# Patient Record
Sex: FEMALE | Race: WHITE | Employment: UNEMPLOYED | URBAN - METROPOLITAN AREA
[De-identification: names, ages, dates, MRNs, and addresses within clinical notes are randomized per-mention and may not be internally consistent; named-entity substitution may affect disease eponyms.]

---

## 2017-01-19 ENCOUNTER — TELEPHONE (OUTPATIENT)
Dept: ORTHOPEDIC SURGERY | Age: 57
End: 2017-01-19

## 2017-01-20 ENCOUNTER — TELEPHONE (OUTPATIENT)
Dept: ORTHOPEDIC SURGERY | Age: 57
End: 2017-01-20

## 2017-01-23 ENCOUNTER — OFFICE VISIT (OUTPATIENT)
Dept: ORTHOPEDIC SURGERY | Age: 57
End: 2017-01-23

## 2017-01-23 VITALS — RESPIRATION RATE: 16 BRPM | WEIGHT: 250 LBS | HEIGHT: 60 IN | BODY MASS INDEX: 49.08 KG/M2

## 2017-01-23 DIAGNOSIS — R52 PAIN: Primary | ICD-10-CM

## 2017-01-23 DIAGNOSIS — S82.892A ANKLE FRACTURE, LEFT, CLOSED, INITIAL ENCOUNTER: ICD-10-CM

## 2017-01-23 PROCEDURE — 73610 X-RAY EXAM OF ANKLE: CPT | Performed by: ORTHOPAEDIC SURGERY

## 2017-01-23 PROCEDURE — 99204 OFFICE O/P NEW MOD 45 MIN: CPT | Performed by: ORTHOPAEDIC SURGERY

## 2017-01-23 RX ORDER — PRAVASTATIN SODIUM 40 MG
40 TABLET ORAL
COMMUNITY
End: 2017-02-14 | Stop reason: ALTCHOICE

## 2017-01-23 RX ORDER — CARISOPRODOL 250 MG/1
250 TABLET ORAL
COMMUNITY
End: 2018-05-30 | Stop reason: ALTCHOICE

## 2017-01-23 RX ORDER — METOPROLOL TARTRATE 50 MG/1
50 TABLET, FILM COATED ORAL
COMMUNITY
End: 2017-11-28 | Stop reason: SDUPTHER

## 2017-01-23 RX ORDER — LANSOPRAZOLE 30 MG/1
30 CAPSULE, DELAYED RELEASE ORAL
COMMUNITY
End: 2017-02-14 | Stop reason: ALTCHOICE

## 2017-01-23 ASSESSMENT — ENCOUNTER SYMPTOMS
GASTROINTESTINAL NEGATIVE: 1
RESPIRATORY NEGATIVE: 1
EYES NEGATIVE: 1

## 2017-01-26 ENCOUNTER — OFFICE VISIT (OUTPATIENT)
Dept: ORTHOPEDIC SURGERY | Age: 57
End: 2017-01-26

## 2017-01-26 VITALS — RESPIRATION RATE: 16 BRPM | HEIGHT: 60 IN | BODY MASS INDEX: 49.08 KG/M2 | WEIGHT: 250 LBS

## 2017-01-26 DIAGNOSIS — R52 PAIN: ICD-10-CM

## 2017-01-26 DIAGNOSIS — S82.892A ANKLE FRACTURE, LEFT, CLOSED, INITIAL ENCOUNTER: Primary | ICD-10-CM

## 2017-01-26 PROCEDURE — 73610 X-RAY EXAM OF ANKLE: CPT | Performed by: ORTHOPAEDIC SURGERY

## 2017-01-26 PROCEDURE — 99024 POSTOP FOLLOW-UP VISIT: CPT | Performed by: ORTHOPAEDIC SURGERY

## 2017-01-26 RX ORDER — HYDROCODONE BITARTRATE AND ACETAMINOPHEN 5; 325 MG/1; MG/1
1 TABLET ORAL EVERY 6 HOURS PRN
Qty: 30 TABLET | Refills: 0 | Status: SHIPPED | OUTPATIENT
Start: 2017-01-26 | End: 2017-02-14 | Stop reason: ALTCHOICE

## 2017-01-26 RX ORDER — LEVOTHYROXINE SODIUM 112 UG/1
TABLET ORAL
COMMUNITY
End: 2017-05-18 | Stop reason: DRUGHIGH

## 2017-01-26 RX ORDER — TOPIRAMATE 100 MG/1
1 TABLET, FILM COATED ORAL
COMMUNITY
End: 2017-03-09 | Stop reason: SDUPTHER

## 2017-01-26 ASSESSMENT — ENCOUNTER SYMPTOMS
RESPIRATORY NEGATIVE: 1
GASTROINTESTINAL NEGATIVE: 1
EYES NEGATIVE: 1

## 2017-02-02 ENCOUNTER — OFFICE VISIT (OUTPATIENT)
Dept: ORTHOPEDIC SURGERY | Age: 57
End: 2017-02-02

## 2017-02-02 VITALS — BODY MASS INDEX: 49.08 KG/M2 | WEIGHT: 250 LBS | RESPIRATION RATE: 16 BRPM | HEIGHT: 60 IN

## 2017-02-02 DIAGNOSIS — R52 PAIN: ICD-10-CM

## 2017-02-02 DIAGNOSIS — S82.892A ANKLE FRACTURE, LEFT, CLOSED, INITIAL ENCOUNTER: Primary | ICD-10-CM

## 2017-02-02 PROCEDURE — 99024 POSTOP FOLLOW-UP VISIT: CPT | Performed by: ORTHOPAEDIC SURGERY

## 2017-02-02 PROCEDURE — 29405 APPL SHORT LEG CAST: CPT | Performed by: ORTHOPAEDIC SURGERY

## 2017-02-02 PROCEDURE — 73610 X-RAY EXAM OF ANKLE: CPT | Performed by: ORTHOPAEDIC SURGERY

## 2017-02-02 RX ORDER — TOPIRAMATE 50 MG/1
TABLET, FILM COATED ORAL
COMMUNITY
Start: 2016-12-29 | End: 2017-02-02 | Stop reason: SDUPTHER

## 2017-02-02 RX ORDER — DICYCLOMINE HCL 20 MG
TABLET ORAL
COMMUNITY
Start: 2016-10-27 | End: 2017-02-14 | Stop reason: SDUPTHER

## 2017-02-02 RX ORDER — ONDANSETRON 4 MG/1
TABLET, ORALLY DISINTEGRATING ORAL
COMMUNITY
Start: 2017-01-19 | End: 2017-02-14 | Stop reason: ALTCHOICE

## 2017-02-02 RX ORDER — RANITIDINE 150 MG/1
TABLET ORAL
COMMUNITY
Start: 2016-12-22 | End: 2017-02-14 | Stop reason: SDUPTHER

## 2017-02-02 RX ORDER — LOSARTAN POTASSIUM 50 MG/1
TABLET ORAL
COMMUNITY
Start: 2017-01-03 | End: 2017-02-14 | Stop reason: ALTCHOICE

## 2017-02-02 ASSESSMENT — ENCOUNTER SYMPTOMS
RESPIRATORY NEGATIVE: 1
EYES NEGATIVE: 1
GASTROINTESTINAL NEGATIVE: 1

## 2017-02-14 ENCOUNTER — OFFICE VISIT (OUTPATIENT)
Dept: FAMILY MEDICINE CLINIC | Age: 57
End: 2017-02-14

## 2017-02-14 VITALS
OXYGEN SATURATION: 96 % | BODY MASS INDEX: 49.08 KG/M2 | DIASTOLIC BLOOD PRESSURE: 82 MMHG | HEART RATE: 58 BPM | RESPIRATION RATE: 22 BRPM | SYSTOLIC BLOOD PRESSURE: 126 MMHG | WEIGHT: 250 LBS | HEIGHT: 60 IN

## 2017-02-14 DIAGNOSIS — Z00.00 ROUTINE HEALTH MAINTENANCE: Primary | ICD-10-CM

## 2017-02-14 DIAGNOSIS — R73.9 ELEVATED BLOOD SUGAR: ICD-10-CM

## 2017-02-14 DIAGNOSIS — S82.892D CLOSED FRACTURE OF LEFT ANKLE WITH ROUTINE HEALING: ICD-10-CM

## 2017-02-14 DIAGNOSIS — E66.01 MORBID OBESITY DUE TO EXCESS CALORIES (HCC): ICD-10-CM

## 2017-02-14 DIAGNOSIS — E03.9 ACQUIRED HYPOTHYROIDISM: ICD-10-CM

## 2017-02-14 DIAGNOSIS — I10 ESSENTIAL HYPERTENSION: ICD-10-CM

## 2017-02-14 DIAGNOSIS — M62.830 BACK MUSCLE SPASM: ICD-10-CM

## 2017-02-14 DIAGNOSIS — K21.9 GASTROESOPHAGEAL REFLUX DISEASE WITHOUT ESOPHAGITIS: ICD-10-CM

## 2017-02-14 LAB
A/G RATIO: 1.6 (ref 1.1–2.2)
ALBUMIN SERPL-MCNC: 4.3 G/DL (ref 3.4–5)
ALP BLD-CCNC: 114 U/L (ref 40–129)
ALT SERPL-CCNC: 25 U/L (ref 10–40)
ANION GAP SERPL CALCULATED.3IONS-SCNC: 15 MMOL/L (ref 3–16)
AST SERPL-CCNC: 19 U/L (ref 15–37)
BILIRUB SERPL-MCNC: 0.6 MG/DL (ref 0–1)
BUN BLDV-MCNC: 11 MG/DL (ref 7–20)
CALCIUM SERPL-MCNC: 9.8 MG/DL (ref 8.3–10.6)
CHLORIDE BLD-SCNC: 100 MMOL/L (ref 99–110)
CHOLESTEROL, TOTAL: 271 MG/DL (ref 0–199)
CO2: 27 MMOL/L (ref 21–32)
CREAT SERPL-MCNC: 0.6 MG/DL (ref 0.6–1.1)
GFR AFRICAN AMERICAN: >60
GFR NON-AFRICAN AMERICAN: >60
GLOBULIN: 2.7 G/DL
GLUCOSE BLD-MCNC: 158 MG/DL (ref 70–99)
HCT VFR BLD CALC: 43.2 % (ref 36–48)
HDLC SERPL-MCNC: 30 MG/DL (ref 40–60)
HEMOGLOBIN: 13.7 G/DL (ref 12–16)
HEPATITIS C ANTIBODY INTERPRETATION: NORMAL
LDL CHOLESTEROL CALCULATED: ABNORMAL MG/DL
LDL CHOLESTEROL DIRECT: 195 MG/DL
MCH RBC QN AUTO: 27.5 PG (ref 26–34)
MCHC RBC AUTO-ENTMCNC: 31.7 G/DL (ref 31–36)
MCV RBC AUTO: 86.7 FL (ref 80–100)
PDW BLD-RTO: 13.7 % (ref 12.4–15.4)
PLATELET # BLD: 291 K/UL (ref 135–450)
PMV BLD AUTO: 10 FL (ref 5–10.5)
POTASSIUM SERPL-SCNC: 4.7 MMOL/L (ref 3.5–5.1)
RBC # BLD: 4.98 M/UL (ref 4–5.2)
SODIUM BLD-SCNC: 142 MMOL/L (ref 136–145)
TOTAL PROTEIN: 7 G/DL (ref 6.4–8.2)
TRIGL SERPL-MCNC: 348 MG/DL (ref 0–150)
TSH REFLEX: 2.91 UIU/ML (ref 0.27–4.2)
VLDLC SERPL CALC-MCNC: ABNORMAL MG/DL
WBC # BLD: 7.8 K/UL (ref 4–11)

## 2017-02-14 PROCEDURE — 99204 OFFICE O/P NEW MOD 45 MIN: CPT | Performed by: NURSE PRACTITIONER

## 2017-02-14 RX ORDER — ESOMEPRAZOLE MAGNESIUM 40 MG/1
40 CAPSULE, DELAYED RELEASE ORAL DAILY
Qty: 30 CAPSULE | Refills: 3 | Status: SHIPPED | OUTPATIENT
Start: 2017-02-14 | End: 2017-05-18 | Stop reason: SDUPTHER

## 2017-02-14 RX ORDER — DICYCLOMINE HCL 20 MG
20 TABLET ORAL 3 TIMES DAILY
Qty: 90 TABLET | Refills: 3 | Status: SHIPPED | OUTPATIENT
Start: 2017-02-14 | End: 2017-05-18 | Stop reason: SDUPTHER

## 2017-02-14 RX ORDER — RANITIDINE 150 MG/1
150 TABLET ORAL NIGHTLY
Qty: 60 TABLET | Refills: 3 | Status: SHIPPED | OUTPATIENT
Start: 2017-02-14 | End: 2017-11-06 | Stop reason: SDUPTHER

## 2017-02-14 RX ORDER — LISINOPRIL 10 MG/1
10 TABLET ORAL DAILY
COMMUNITY
End: 2017-02-14 | Stop reason: SINTOL

## 2017-02-14 RX ORDER — IBUPROFEN 800 MG/1
800 TABLET ORAL EVERY 8 HOURS PRN
Qty: 90 TABLET | Refills: 0 | Status: SHIPPED | OUTPATIENT
Start: 2017-02-14 | End: 2019-04-10

## 2017-02-14 RX ORDER — LOSARTAN POTASSIUM 50 MG/1
50 TABLET ORAL DAILY
Qty: 30 TABLET | Refills: 3 | Status: SHIPPED | OUTPATIENT
Start: 2017-02-14 | End: 2017-05-18 | Stop reason: SDUPTHER

## 2017-02-14 ASSESSMENT — ENCOUNTER SYMPTOMS
RHINORRHEA: 0
EYE DISCHARGE: 0
BACK PAIN: 1
EYE PAIN: 0
COLOR CHANGE: 0
CHEST TIGHTNESS: 0
GASTROINTESTINAL NEGATIVE: 1
EYE REDNESS: 0
WHEEZING: 0
SHORTNESS OF BREATH: 0
ABDOMINAL PAIN: 0
SORE THROAT: 0
RESPIRATORY NEGATIVE: 1
SINUS PRESSURE: 0
NAUSEA: 0
EYES NEGATIVE: 1
COUGH: 0

## 2017-02-15 DIAGNOSIS — E11.9 TYPE 2 DIABETES MELLITUS WITHOUT COMPLICATION, WITHOUT LONG-TERM CURRENT USE OF INSULIN (HCC): Primary | ICD-10-CM

## 2017-02-15 DIAGNOSIS — E78.2 MIXED HYPERLIPIDEMIA: Primary | ICD-10-CM

## 2017-02-15 PROBLEM — S82.892D CLOSED FRACTURE OF LEFT ANKLE WITH ROUTINE HEALING: Status: ACTIVE | Noted: 2017-02-15

## 2017-02-15 LAB
ESTIMATED AVERAGE GLUCOSE: 165.7 MG/DL
HBA1C MFR BLD: 7.4 %

## 2017-02-15 RX ORDER — BLOOD-GLUCOSE METER
EACH MISCELLANEOUS
Qty: 1 KIT | Refills: 0 | Status: SHIPPED | OUTPATIENT
Start: 2017-02-15

## 2017-02-15 RX ORDER — ATORVASTATIN CALCIUM 20 MG/1
20 TABLET, FILM COATED ORAL DAILY
Qty: 30 TABLET | Refills: 3 | Status: SHIPPED | OUTPATIENT
Start: 2017-02-15 | End: 2017-05-18 | Stop reason: SDUPTHER

## 2017-02-15 RX ORDER — LANCETS 30 GAUGE
EACH MISCELLANEOUS
Qty: 100 EACH | Refills: 3 | Status: SHIPPED | OUTPATIENT
Start: 2017-02-15

## 2017-02-16 ENCOUNTER — OFFICE VISIT (OUTPATIENT)
Dept: ORTHOPEDIC SURGERY | Age: 57
End: 2017-02-16

## 2017-02-16 VITALS — RESPIRATION RATE: 16 BRPM | HEIGHT: 60 IN | BODY MASS INDEX: 49.08 KG/M2 | WEIGHT: 250 LBS

## 2017-02-16 DIAGNOSIS — S82.892A ANKLE FRACTURE, LEFT, CLOSED, INITIAL ENCOUNTER: Primary | ICD-10-CM

## 2017-02-16 DIAGNOSIS — R52 PAIN: ICD-10-CM

## 2017-02-16 PROCEDURE — 73610 X-RAY EXAM OF ANKLE: CPT | Performed by: ORTHOPAEDIC SURGERY

## 2017-02-16 PROCEDURE — 99024 POSTOP FOLLOW-UP VISIT: CPT | Performed by: ORTHOPAEDIC SURGERY

## 2017-02-16 RX ORDER — HYDROCODONE BITARTRATE AND ACETAMINOPHEN 5; 325 MG/1; MG/1
TABLET ORAL
COMMUNITY
Start: 2017-01-26 | End: 2017-06-15

## 2017-02-16 ASSESSMENT — ENCOUNTER SYMPTOMS
RESPIRATORY NEGATIVE: 1
EYES NEGATIVE: 1
GASTROINTESTINAL NEGATIVE: 1

## 2017-02-17 ENCOUNTER — TELEPHONE (OUTPATIENT)
Dept: FAMILY MEDICINE CLINIC | Age: 57
End: 2017-02-17

## 2017-02-28 ENCOUNTER — HOSPITAL ENCOUNTER (OUTPATIENT)
Dept: PHYSICAL THERAPY | Age: 57
Discharge: HOME | End: 2017-02-28
Attending: NURSE PRACTITIONER | Admitting: NURSE PRACTITIONER

## 2017-02-28 ENCOUNTER — OFFICE VISIT (OUTPATIENT)
Dept: ORTHOPEDIC SURGERY | Age: 57
End: 2017-02-28

## 2017-02-28 VITALS — WEIGHT: 250 LBS | BODY MASS INDEX: 49.08 KG/M2 | HEIGHT: 60 IN | RESPIRATION RATE: 18 BRPM

## 2017-02-28 DIAGNOSIS — S82.892A ANKLE FRACTURE, LEFT, CLOSED, INITIAL ENCOUNTER: Primary | ICD-10-CM

## 2017-02-28 DIAGNOSIS — S82.892D CLOSED FRACTURE OF LEFT ANKLE WITH ROUTINE HEALING: ICD-10-CM

## 2017-02-28 DIAGNOSIS — R52 PAIN: ICD-10-CM

## 2017-02-28 PROCEDURE — 99024 POSTOP FOLLOW-UP VISIT: CPT | Performed by: ORTHOPAEDIC SURGERY

## 2017-02-28 PROCEDURE — 73610 X-RAY EXAM OF ANKLE: CPT | Performed by: ORTHOPAEDIC SURGERY

## 2017-02-28 ASSESSMENT — PAIN DESCRIPTION - PAIN TYPE: TYPE: CHRONIC PAIN

## 2017-02-28 ASSESSMENT — PAIN DESCRIPTION - FREQUENCY: FREQUENCY: INTERMITTENT

## 2017-02-28 ASSESSMENT — PAIN SCALES - GENERAL: PAINLEVEL_OUTOF10: 1

## 2017-02-28 ASSESSMENT — PAIN DESCRIPTION - LOCATION: LOCATION: BACK

## 2017-02-28 ASSESSMENT — PAIN DESCRIPTION - ONSET: ONSET: AWAKENED FROM SLEEP

## 2017-02-28 ASSESSMENT — PAIN DESCRIPTION - PROGRESSION: CLINICAL_PROGRESSION: GRADUALLY WORSENING

## 2017-03-01 ENCOUNTER — HOSPITAL ENCOUNTER (OUTPATIENT)
Dept: PHYSICAL THERAPY | Age: 57
Discharge: HOME | End: 2017-03-31
Attending: NURSE PRACTITIONER | Admitting: ORTHOPAEDIC SURGERY

## 2017-03-09 RX ORDER — TOPIRAMATE 100 MG/1
100 TABLET, FILM COATED ORAL DAILY
Qty: 60 TABLET | Refills: 0 | Status: SHIPPED | OUTPATIENT
Start: 2017-03-09 | End: 2017-03-10 | Stop reason: DRUGHIGH

## 2017-03-10 ENCOUNTER — TELEPHONE (OUTPATIENT)
Dept: FAMILY MEDICINE CLINIC | Age: 57
End: 2017-03-10

## 2017-03-10 ENCOUNTER — HOSPITAL ENCOUNTER (OUTPATIENT)
Dept: PHYSICAL THERAPY | Age: 57
Discharge: HOME | End: 2017-03-10
Admitting: NURSE PRACTITIONER

## 2017-03-10 RX ORDER — TOPIRAMATE 50 MG/1
50 TABLET, FILM COATED ORAL DAILY
Qty: 60 TABLET | Refills: 1 | Status: SHIPPED | OUTPATIENT
Start: 2017-03-10 | End: 2017-08-10 | Stop reason: SDUPTHER

## 2017-03-21 ENCOUNTER — HOSPITAL ENCOUNTER (OUTPATIENT)
Dept: PHYSICAL THERAPY | Age: 57
Discharge: HOME | End: 2017-03-21
Admitting: NURSE PRACTITIONER

## 2017-03-23 ENCOUNTER — OFFICE VISIT (OUTPATIENT)
Dept: ORTHOPEDIC SURGERY | Age: 57
End: 2017-03-23

## 2017-03-23 VITALS — WEIGHT: 250 LBS | HEIGHT: 60 IN | RESPIRATION RATE: 18 BRPM | BODY MASS INDEX: 49.08 KG/M2

## 2017-03-23 DIAGNOSIS — R52 PAIN: ICD-10-CM

## 2017-03-23 DIAGNOSIS — S82.892D CLOSED FRACTURE OF LEFT ANKLE WITH ROUTINE HEALING: Primary | ICD-10-CM

## 2017-03-23 DIAGNOSIS — S82.892A ANKLE FRACTURE, LEFT, CLOSED, INITIAL ENCOUNTER: ICD-10-CM

## 2017-03-23 DIAGNOSIS — G90.522 COMPLEX REGIONAL PAIN SYNDROME TYPE 1 OF LEFT LOWER EXTREMITY: ICD-10-CM

## 2017-03-23 PROCEDURE — 99024 POSTOP FOLLOW-UP VISIT: CPT | Performed by: ORTHOPAEDIC SURGERY

## 2017-03-23 PROCEDURE — 73562 X-RAY EXAM OF KNEE 3: CPT | Performed by: ORTHOPAEDIC SURGERY

## 2017-04-01 ENCOUNTER — HOSPITAL ENCOUNTER (OUTPATIENT)
Dept: PHYSICAL THERAPY | Age: 57
Discharge: HOME | End: 2017-04-30
Attending: NURSE PRACTITIONER | Admitting: ORTHOPAEDIC SURGERY

## 2017-04-04 ENCOUNTER — HOSPITAL ENCOUNTER (OUTPATIENT)
Dept: PHYSICAL THERAPY | Age: 57
Discharge: HOME | End: 2017-04-04
Admitting: ORTHOPAEDIC SURGERY

## 2017-04-10 ENCOUNTER — TELEPHONE (OUTPATIENT)
Dept: FAMILY MEDICINE CLINIC | Age: 57
End: 2017-04-10

## 2017-04-13 ENCOUNTER — OFFICE VISIT (OUTPATIENT)
Dept: FAMILY MEDICINE CLINIC | Age: 57
End: 2017-04-13

## 2017-04-13 VITALS
RESPIRATION RATE: 18 BRPM | OXYGEN SATURATION: 99 % | SYSTOLIC BLOOD PRESSURE: 120 MMHG | HEIGHT: 60 IN | DIASTOLIC BLOOD PRESSURE: 82 MMHG | WEIGHT: 260 LBS | BODY MASS INDEX: 51.04 KG/M2 | HEART RATE: 54 BPM

## 2017-04-13 DIAGNOSIS — G89.29 CHRONIC BILATERAL LOW BACK PAIN WITH LEFT-SIDED SCIATICA: Primary | ICD-10-CM

## 2017-04-13 DIAGNOSIS — E11.69 DIABETES MELLITUS TYPE 2 IN OBESE (HCC): ICD-10-CM

## 2017-04-13 DIAGNOSIS — M54.42 CHRONIC BILATERAL LOW BACK PAIN WITH LEFT-SIDED SCIATICA: Primary | ICD-10-CM

## 2017-04-13 DIAGNOSIS — M62.830 BACK MUSCLE SPASM: ICD-10-CM

## 2017-04-13 DIAGNOSIS — E66.01 MORBID OBESITY DUE TO EXCESS CALORIES (HCC): ICD-10-CM

## 2017-04-13 DIAGNOSIS — E66.9 DIABETES MELLITUS TYPE 2 IN OBESE (HCC): ICD-10-CM

## 2017-04-13 DIAGNOSIS — Z91.199 MEDICALLY NONCOMPLIANT: ICD-10-CM

## 2017-04-13 PROCEDURE — 99214 OFFICE O/P EST MOD 30 MIN: CPT | Performed by: NURSE PRACTITIONER

## 2017-04-13 RX ORDER — PREDNISONE 20 MG/1
50 TABLET ORAL DAILY
Qty: 13 TABLET | Refills: 0 | Status: SHIPPED | OUTPATIENT
Start: 2017-04-13 | End: 2017-04-18

## 2017-04-13 ASSESSMENT — ENCOUNTER SYMPTOMS
SHORTNESS OF BREATH: 0
GASTROINTESTINAL NEGATIVE: 1
COUGH: 0
WHEEZING: 0
BACK PAIN: 1
RESPIRATORY NEGATIVE: 1

## 2017-05-01 ENCOUNTER — HOSPITAL ENCOUNTER (OUTPATIENT)
Dept: PHYSICAL THERAPY | Age: 57
Discharge: HOME | End: 2017-05-16
Attending: NURSE PRACTITIONER | Admitting: ORTHOPAEDIC SURGERY

## 2017-05-18 ENCOUNTER — OFFICE VISIT (OUTPATIENT)
Dept: INTERNAL MEDICINE CLINIC | Age: 57
End: 2017-05-18

## 2017-05-18 VITALS
HEART RATE: 60 BPM | SYSTOLIC BLOOD PRESSURE: 120 MMHG | HEIGHT: 61 IN | DIASTOLIC BLOOD PRESSURE: 80 MMHG | BODY MASS INDEX: 49.84 KG/M2 | RESPIRATION RATE: 12 BRPM | OXYGEN SATURATION: 98 % | WEIGHT: 264 LBS

## 2017-05-18 DIAGNOSIS — E78.2 MIXED HYPERLIPIDEMIA: ICD-10-CM

## 2017-05-18 DIAGNOSIS — R06.09 EXERTIONAL DYSPNEA: Primary | ICD-10-CM

## 2017-05-18 DIAGNOSIS — I10 ESSENTIAL HYPERTENSION: ICD-10-CM

## 2017-05-18 DIAGNOSIS — E11.9 TYPE 2 DIABETES MELLITUS WITHOUT COMPLICATION, WITHOUT LONG-TERM CURRENT USE OF INSULIN (HCC): ICD-10-CM

## 2017-05-18 DIAGNOSIS — K21.9 GASTROESOPHAGEAL REFLUX DISEASE WITHOUT ESOPHAGITIS: ICD-10-CM

## 2017-05-18 DIAGNOSIS — K58.9 IRRITABLE BOWEL SYNDROME, UNSPECIFIED TYPE: ICD-10-CM

## 2017-05-18 DIAGNOSIS — E03.9 ACQUIRED HYPOTHYROIDISM: ICD-10-CM

## 2017-05-18 LAB
CREATININE URINE POCT: 200
HBA1C MFR BLD: 7.6 %
MICROALBUMIN/CREAT 24H UR: 10 MG/G{CREAT}
MICROALBUMIN/CREAT UR-RTO: <30

## 2017-05-18 PROCEDURE — 83036 HEMOGLOBIN GLYCOSYLATED A1C: CPT | Performed by: INTERNAL MEDICINE

## 2017-05-18 PROCEDURE — 93000 ELECTROCARDIOGRAM COMPLETE: CPT | Performed by: INTERNAL MEDICINE

## 2017-05-18 PROCEDURE — 99214 OFFICE O/P EST MOD 30 MIN: CPT | Performed by: INTERNAL MEDICINE

## 2017-05-18 PROCEDURE — 82044 UR ALBUMIN SEMIQUANTITATIVE: CPT | Performed by: INTERNAL MEDICINE

## 2017-05-18 RX ORDER — LEVOTHYROXINE SODIUM 0.05 MG/1
50 TABLET ORAL DAILY
COMMUNITY
End: 2017-11-06 | Stop reason: SDUPTHER

## 2017-05-18 RX ORDER — GLUCOSAMINE HCL/CHONDROITIN SU 500-400 MG
CAPSULE ORAL
Qty: 60 STRIP | Refills: 11 | Status: SHIPPED | OUTPATIENT
Start: 2017-05-18

## 2017-05-18 RX ORDER — LOSARTAN POTASSIUM 50 MG/1
50 TABLET ORAL DAILY
Qty: 30 TABLET | Refills: 3 | Status: SHIPPED | OUTPATIENT
Start: 2017-05-18 | End: 2017-08-10 | Stop reason: SDUPTHER

## 2017-05-18 RX ORDER — LANCETS 30 GAUGE
EACH MISCELLANEOUS
Qty: 60 EACH | Refills: 11 | Status: SHIPPED | OUTPATIENT
Start: 2017-05-18 | End: 2018-05-30 | Stop reason: SDUPTHER

## 2017-05-18 RX ORDER — ESOMEPRAZOLE MAGNESIUM 40 MG/1
40 CAPSULE, DELAYED RELEASE ORAL DAILY
Qty: 30 CAPSULE | Refills: 3 | Status: SHIPPED | OUTPATIENT
Start: 2017-05-18 | End: 2017-06-15 | Stop reason: CLARIF

## 2017-05-18 RX ORDER — BLOOD-GLUCOSE METER
1 KIT MISCELLANEOUS 2 TIMES DAILY
Qty: 1 KIT | Refills: 0 | Status: CANCELLED | OUTPATIENT
Start: 2017-05-18

## 2017-05-18 RX ORDER — ONDANSETRON 4 MG/1
4 TABLET, FILM COATED ORAL EVERY 8 HOURS PRN
COMMUNITY
End: 2017-06-15 | Stop reason: ALTCHOICE

## 2017-05-18 RX ORDER — ATORVASTATIN CALCIUM 40 MG/1
40 TABLET, FILM COATED ORAL DAILY
Qty: 30 TABLET | Refills: 2 | Status: SHIPPED | OUTPATIENT
Start: 2017-05-18 | End: 2017-07-21 | Stop reason: DRUGHIGH

## 2017-05-18 RX ORDER — DICYCLOMINE HCL 20 MG
20 TABLET ORAL 3 TIMES DAILY PRN
Qty: 90 TABLET | Refills: 3 | Status: SHIPPED | OUTPATIENT
Start: 2017-05-18 | End: 2017-11-06 | Stop reason: SDUPTHER

## 2017-05-18 ASSESSMENT — PATIENT HEALTH QUESTIONNAIRE - PHQ9
2. FEELING DOWN, DEPRESSED OR HOPELESS: 0
1. LITTLE INTEREST OR PLEASURE IN DOING THINGS: 0
SUM OF ALL RESPONSES TO PHQ9 QUESTIONS 1 & 2: 0
SUM OF ALL RESPONSES TO PHQ QUESTIONS 1-9: 0

## 2017-06-08 ENCOUNTER — HOSPITAL ENCOUNTER (OUTPATIENT)
Dept: CARDIAC REHAB | Age: 57
Discharge: HOME | End: 2017-06-08
Attending: INTERNAL MEDICINE | Admitting: INTERNAL MEDICINE

## 2017-06-08 LAB
LV EF: 53 %
LV EF: 79 %
LVEF MODALITY: NORMAL
LVEF MODALITY: NORMAL

## 2017-06-08 RX ADMIN — Medication 30 MILLICURIE: at 13:36

## 2017-06-08 RX ADMIN — Medication 10 MILLICURIE: at 13:36

## 2017-06-15 ENCOUNTER — OFFICE VISIT (OUTPATIENT)
Dept: INTERNAL MEDICINE CLINIC | Age: 57
End: 2017-06-15

## 2017-06-15 VITALS
OXYGEN SATURATION: 98 % | WEIGHT: 262 LBS | RESPIRATION RATE: 12 BRPM | SYSTOLIC BLOOD PRESSURE: 138 MMHG | BODY MASS INDEX: 49.47 KG/M2 | HEIGHT: 61 IN | DIASTOLIC BLOOD PRESSURE: 80 MMHG | HEART RATE: 62 BPM

## 2017-06-15 DIAGNOSIS — E11.69 DIABETES MELLITUS TYPE 2 IN OBESE (HCC): Primary | ICD-10-CM

## 2017-06-15 DIAGNOSIS — I10 ESSENTIAL HYPERTENSION: ICD-10-CM

## 2017-06-15 DIAGNOSIS — E66.01 MORBID OBESITY DUE TO EXCESS CALORIES (HCC): ICD-10-CM

## 2017-06-15 DIAGNOSIS — K21.9 GASTROESOPHAGEAL REFLUX DISEASE, ESOPHAGITIS PRESENCE NOT SPECIFIED: ICD-10-CM

## 2017-06-15 DIAGNOSIS — E03.9 ACQUIRED HYPOTHYROIDISM: ICD-10-CM

## 2017-06-15 DIAGNOSIS — E66.9 DIABETES MELLITUS TYPE 2 IN OBESE (HCC): Primary | ICD-10-CM

## 2017-06-15 DIAGNOSIS — E78.2 MIXED HYPERLIPIDEMIA: ICD-10-CM

## 2017-06-15 PROCEDURE — 99214 OFFICE O/P EST MOD 30 MIN: CPT | Performed by: INTERNAL MEDICINE

## 2017-06-15 RX ORDER — OMEPRAZOLE 20 MG/1
20 CAPSULE, DELAYED RELEASE ORAL DAILY
Qty: 30 CAPSULE | Refills: 3 | Status: SHIPPED | OUTPATIENT
Start: 2017-06-15 | End: 2017-10-26 | Stop reason: SDUPTHER

## 2017-07-13 ENCOUNTER — NURSE ONLY (OUTPATIENT)
Dept: INTERNAL MEDICINE CLINIC | Age: 57
End: 2017-07-13

## 2017-07-13 DIAGNOSIS — E78.2 MIXED HYPERLIPIDEMIA: ICD-10-CM

## 2017-07-13 LAB
ALT SERPL-CCNC: 23 U/L (ref 10–40)
LDL CHOLESTEROL DIRECT: 120 MG/DL

## 2017-07-13 PROCEDURE — 36415 COLL VENOUS BLD VENIPUNCTURE: CPT | Performed by: INTERNAL MEDICINE

## 2017-07-21 DIAGNOSIS — E78.2 MIXED HYPERLIPIDEMIA: Primary | ICD-10-CM

## 2017-07-21 RX ORDER — ATORVASTATIN CALCIUM 80 MG/1
80 TABLET, FILM COATED ORAL DAILY
Qty: 30 TABLET | Refills: 3 | Status: SHIPPED | OUTPATIENT
Start: 2017-07-21 | End: 2017-08-10

## 2017-07-24 ENCOUNTER — TELEPHONE (OUTPATIENT)
Dept: FAMILY MEDICINE CLINIC | Age: 57
End: 2017-07-24

## 2017-08-10 ENCOUNTER — TELEPHONE (OUTPATIENT)
Dept: CARDIOLOGY CLINIC | Age: 57
End: 2017-08-10

## 2017-08-10 ENCOUNTER — OFFICE VISIT (OUTPATIENT)
Dept: INTERNAL MEDICINE CLINIC | Age: 57
End: 2017-08-10

## 2017-08-10 VITALS
HEART RATE: 68 BPM | RESPIRATION RATE: 12 BRPM | BODY MASS INDEX: 51.24 KG/M2 | WEIGHT: 261 LBS | SYSTOLIC BLOOD PRESSURE: 132 MMHG | DIASTOLIC BLOOD PRESSURE: 85 MMHG | OXYGEN SATURATION: 99 % | HEIGHT: 60 IN

## 2017-08-10 DIAGNOSIS — I10 ESSENTIAL HYPERTENSION: ICD-10-CM

## 2017-08-10 DIAGNOSIS — E66.9 DIABETES MELLITUS TYPE 2 IN OBESE (HCC): ICD-10-CM

## 2017-08-10 DIAGNOSIS — R06.09 EXERTIONAL DYSPNEA: Primary | ICD-10-CM

## 2017-08-10 DIAGNOSIS — E11.69 DIABETES MELLITUS TYPE 2 IN OBESE (HCC): ICD-10-CM

## 2017-08-10 DIAGNOSIS — E66.01 MORBID OBESITY DUE TO EXCESS CALORIES (HCC): ICD-10-CM

## 2017-08-10 PROCEDURE — 99213 OFFICE O/P EST LOW 20 MIN: CPT | Performed by: INTERNAL MEDICINE

## 2017-08-10 RX ORDER — ATORVASTATIN CALCIUM 40 MG/1
40 TABLET, FILM COATED ORAL DAILY
Qty: 30 TABLET | Refills: 3 | Status: SHIPPED | OUTPATIENT
Start: 2017-08-10 | End: 2017-11-06 | Stop reason: SDUPTHER

## 2017-08-10 RX ORDER — ATORVASTATIN CALCIUM 40 MG/1
40 TABLET, FILM COATED ORAL DAILY
COMMUNITY
End: 2017-08-10 | Stop reason: SDUPTHER

## 2017-08-10 RX ORDER — LOSARTAN POTASSIUM 50 MG/1
50 TABLET ORAL DAILY
Qty: 30 TABLET | Refills: 3 | Status: SHIPPED | OUTPATIENT
Start: 2017-08-10 | End: 2017-11-06 | Stop reason: SDUPTHER

## 2017-08-10 RX ORDER — TOPIRAMATE 50 MG/1
50 TABLET, FILM COATED ORAL DAILY
Qty: 30 TABLET | Refills: 3 | Status: SHIPPED | OUTPATIENT
Start: 2017-08-10 | End: 2017-11-06 | Stop reason: SDUPTHER

## 2017-08-17 ENCOUNTER — OFFICE VISIT (OUTPATIENT)
Dept: INTERNAL MEDICINE CLINIC | Age: 57
End: 2017-08-17

## 2017-08-17 VITALS
OXYGEN SATURATION: 98 % | DIASTOLIC BLOOD PRESSURE: 84 MMHG | HEART RATE: 55 BPM | WEIGHT: 262.4 LBS | SYSTOLIC BLOOD PRESSURE: 128 MMHG | HEIGHT: 60 IN | BODY MASS INDEX: 51.51 KG/M2

## 2017-08-17 DIAGNOSIS — F39 MOOD DISORDER (HCC): Primary | ICD-10-CM

## 2017-08-17 PROCEDURE — 99213 OFFICE O/P EST LOW 20 MIN: CPT | Performed by: INTERNAL MEDICINE

## 2017-08-17 RX ORDER — DIVALPROEX SODIUM 250 MG/1
250 TABLET, DELAYED RELEASE ORAL 2 TIMES DAILY
Qty: 90 TABLET | Refills: 3 | Status: SHIPPED | OUTPATIENT
Start: 2017-08-17 | End: 2017-11-06 | Stop reason: ALTCHOICE

## 2017-08-22 ENCOUNTER — INITIAL CONSULT (OUTPATIENT)
Dept: CARDIOLOGY CLINIC | Age: 57
End: 2017-08-22

## 2017-08-22 VITALS
WEIGHT: 262 LBS | DIASTOLIC BLOOD PRESSURE: 82 MMHG | BODY MASS INDEX: 51.44 KG/M2 | OXYGEN SATURATION: 98 % | HEIGHT: 60 IN | SYSTOLIC BLOOD PRESSURE: 126 MMHG | HEART RATE: 62 BPM

## 2017-08-22 DIAGNOSIS — E66.9 OBESITY (BMI 35.0-39.9 WITHOUT COMORBIDITY): Primary | ICD-10-CM

## 2017-08-22 PROCEDURE — 99214 OFFICE O/P EST MOD 30 MIN: CPT | Performed by: INTERNAL MEDICINE

## 2017-08-22 RX ORDER — PHENTERMINE HYDROCHLORIDE 37.5 MG/1
37.5 TABLET ORAL
Qty: 30 TABLET | Refills: 0 | Status: SHIPPED | OUTPATIENT
Start: 2017-08-22 | End: 2017-09-19 | Stop reason: SDUPTHER

## 2017-08-24 ENCOUNTER — HOSPITAL ENCOUNTER (OUTPATIENT)
Dept: CARDIAC REHAB | Age: 57
Discharge: HOME | End: 2017-08-24
Attending: INTERNAL MEDICINE | Admitting: INTERNAL MEDICINE

## 2017-08-31 ENCOUNTER — OFFICE VISIT (OUTPATIENT)
Dept: INTERNAL MEDICINE CLINIC | Age: 57
End: 2017-08-31

## 2017-08-31 VITALS
SYSTOLIC BLOOD PRESSURE: 130 MMHG | HEART RATE: 73 BPM | RESPIRATION RATE: 12 BRPM | HEIGHT: 60 IN | DIASTOLIC BLOOD PRESSURE: 83 MMHG | BODY MASS INDEX: 50.65 KG/M2 | WEIGHT: 258 LBS | OXYGEN SATURATION: 98 %

## 2017-08-31 DIAGNOSIS — I10 ESSENTIAL HYPERTENSION: Primary | ICD-10-CM

## 2017-08-31 DIAGNOSIS — E03.9 ACQUIRED HYPOTHYROIDISM: ICD-10-CM

## 2017-08-31 DIAGNOSIS — E66.9 DIABETES MELLITUS TYPE 2 IN OBESE (HCC): ICD-10-CM

## 2017-08-31 DIAGNOSIS — E11.69 DIABETES MELLITUS TYPE 2 IN OBESE (HCC): ICD-10-CM

## 2017-08-31 LAB — HBA1C MFR BLD: 7.6 %

## 2017-08-31 PROCEDURE — 99212 OFFICE O/P EST SF 10 MIN: CPT | Performed by: INTERNAL MEDICINE

## 2017-08-31 PROCEDURE — 83036 HEMOGLOBIN GLYCOSYLATED A1C: CPT | Performed by: INTERNAL MEDICINE

## 2017-09-19 ENCOUNTER — OFFICE VISIT (OUTPATIENT)
Dept: CARDIOLOGY CLINIC | Age: 57
End: 2017-09-19

## 2017-09-19 VITALS
SYSTOLIC BLOOD PRESSURE: 138 MMHG | WEIGHT: 255.8 LBS | DIASTOLIC BLOOD PRESSURE: 84 MMHG | HEART RATE: 70 BPM | HEIGHT: 60 IN | BODY MASS INDEX: 50.22 KG/M2

## 2017-09-19 DIAGNOSIS — E66.9 OBESITY (BMI 35.0-39.9 WITHOUT COMORBIDITY): ICD-10-CM

## 2017-09-19 PROCEDURE — 99213 OFFICE O/P EST LOW 20 MIN: CPT | Performed by: INTERNAL MEDICINE

## 2017-09-19 RX ORDER — PHENTERMINE HYDROCHLORIDE 37.5 MG/1
37.5 TABLET ORAL
Qty: 30 TABLET | Refills: 0 | Status: SHIPPED | OUTPATIENT
Start: 2017-09-19 | End: 2017-10-19 | Stop reason: SDUPTHER

## 2017-10-02 ENCOUNTER — TELEPHONE (OUTPATIENT)
Dept: CARDIOLOGY CLINIC | Age: 57
End: 2017-10-02

## 2017-10-02 NOTE — TELEPHONE ENCOUNTER
Patient received a EOB from her [facility] asking if we can resubmit her last office visit so it will be covered obesity is not a covered diagnosis

## 2017-10-03 NOTE — TELEPHONE ENCOUNTER
Called patient to discuss her diagnosis. Left her a message informing her that Dr. Marciano Vera did the original consult for obesity that is why we are having this issue. She may need to change her Dx. For the referral since she order a NM stress. Once I have and answer I will give her a call back.

## 2017-10-19 ENCOUNTER — OFFICE VISIT (OUTPATIENT)
Dept: CARDIOLOGY CLINIC | Age: 57
End: 2017-10-19

## 2017-10-19 VITALS
SYSTOLIC BLOOD PRESSURE: 110 MMHG | WEIGHT: 242.2 LBS | HEART RATE: 62 BPM | HEIGHT: 60 IN | DIASTOLIC BLOOD PRESSURE: 80 MMHG | BODY MASS INDEX: 47.55 KG/M2

## 2017-10-19 DIAGNOSIS — E66.9 OBESITY (BMI 35.0-39.9 WITHOUT COMORBIDITY): ICD-10-CM

## 2017-10-19 DIAGNOSIS — I10 ESSENTIAL HYPERTENSION: Primary | ICD-10-CM

## 2017-10-19 PROCEDURE — 99214 OFFICE O/P EST MOD 30 MIN: CPT | Performed by: INTERNAL MEDICINE

## 2017-10-19 RX ORDER — PHENTERMINE HYDROCHLORIDE 37.5 MG/1
37.5 TABLET ORAL
Qty: 30 TABLET | Refills: 0 | Status: SHIPPED | OUTPATIENT
Start: 2017-10-19 | End: 2017-11-18

## 2017-10-19 NOTE — PROGRESS NOTES
Linette Cho MD        OFFICE  FOLLOWUP NOTE    Chief complaints: patient is here for management of obesity, DM, HTN,IBS,DYSLPIDEMIA  Subjective: patient feels better, no chest pain, no shortness of breath, no dizziness, no palpitations    HPI Fabienne Campa is a 62 y. o.year old who  has a past medical history of Diabetes ([facility] [address] DM (diabetes mellitus) ([facility] [address] Dyslipidemia; GERD (gastroesophageal reflux disease); H/O cardiovascular stress test; H/O echocardiogram; Hypertension; IBS (irritable bowel syndrome); and Sphincter of Oddi spasm.  and presents for management of obesity, DM, HTN,IBS,DYSLPIDEMIA which are well controlled      Current Outpatient Prescriptions   Medication Sig Dispense Refill    phentermine (ADIPEX-P) 37.5 MG tablet Take 1 tablet by mouth every morning (before breakfast) 30 tablet 0    divalproex (DEPAKOTE) 250 MG DR tablet Take 1 tablet by mouth 2 times daily 90 tablet 3    atorvastatin (LIPITOR) 40 MG tablet Take 1 tablet by mouth daily 30 tablet 3    losartan (COZAAR) 50 MG tablet Take 1 tablet by mouth daily 30 tablet 3    topiramate (TOPAMAX) 50 MG tablet Take 1 tablet by mouth daily 30 tablet 3    omeprazole (PRILOSEC) 20 MG delayed release capsule Take 1 capsule by mouth Daily 30 capsule 3    levothyroxine (SYNTHROID) 50 MCG tablet Take 50 mcg by mouth Daily      dicyclomine (BENTYL) 20 MG tablet Take 1 tablet by mouth 3 times daily as needed (cramp) 90 tablet 3    Glucose Blood (BLOOD GLUCOSE TEST STRIPS) STRP To check blood sugar twice daily with current Glucometer:   DX: diabetes type .00 60 strip 11    Lancets MISC To test twice daily  Dx: E 11.9 60 each 11    metFORMIN (GLUCOPHAGE) 500 MG tablet Take 1 tablet by mouth 2 times daily (with meals) 60 tablet 3    Blood Glucose Monitoring Suppl (RELION ULTIMA GLUCOSE SYSTEM) W/DEVICE KIT Use to check BS twice daily 1 kit 0    RELION ULTRA THIN LANCETS 30G MISC Check BS twice daily- before breakfast and two cough or wheezing    · Gastrointestinal: No abdominal pain, appetite loss, blood in stools, constipation, diarrhea or heartburn  · Genitourinary: No dysuria, trouble voiding, or hematuria  · Musculoskeletal:  No gait disturbance, weakness or joint complaints  · Integumentary: No rash or pruritis  · Neurological: No TIA or stroke symptoms  · Psychiatric: No anxiety or depression  · Endocrine: No malaise, fatigue or temperature intolerance  · Hematologic/Lymphatic: No bleeding problems, blood clots or swollen lymph nodes  · Allergic/Immunologic: No nasal congestion or hives  All systems negative except as marked. Objective:  /80   Pulse 62   Ht 5' (1.524 m)   Wt 242 lb 3.2 oz (109.9 kg)   BMI 47.30 kg/m²   Wt Readings from Last 3 Encounters:   10/19/17 242 lb 3.2 oz (109.9 kg)   09/19/17 255 lb 12.8 oz (116 kg)   08/31/17 258 lb (117 kg)     Body mass index is 47.3 kg/m². GENERAL - Alert, oriented, pleasant, in no apparent distress,normal grooming  HEENT  pupils are reactive to light and accomodation, cornea intact, conjunctive normal color, ears are normal in exam,throat exam in normal, teeth, gum and palate are normal, oral mucosa is normal without any notation of pallor or cyanosis  Neck - Supple. No jugular venous distention noted. No carotid bruits, no apical -carotid delay  Respiratory:  Normal breath sounds, No respiratory distress, No wheezing, No chest tenderness. ,no use of accessory muscles, diaphragm movement is normal  Cardiovascular: (PMI) apex non displaced,no lifts no thrills, no s3,no s4, Normal heart rate, Normal rhythm, No murmurs, No rubs, No gallops.  Carotid arteries pulse and amplitude are normal no bruit, no abdominal bruit noted ( normal abdominal aorta ausculation), femoral arteries pulse and amplitude are normal no bruit, pedal pulses are normal  Femoral pulses have normal amplitude, no bruits   Extremities - No cyanosis, clubbing, or significant edema, no varicose veins

## 2017-10-26 DIAGNOSIS — K21.9 GASTROESOPHAGEAL REFLUX DISEASE, ESOPHAGITIS PRESENCE NOT SPECIFIED: ICD-10-CM

## 2017-10-27 RX ORDER — OMEPRAZOLE 20 MG/1
CAPSULE, DELAYED RELEASE ORAL
Qty: 30 CAPSULE | Refills: 3 | Status: SHIPPED | OUTPATIENT
Start: 2017-10-27 | End: 2018-09-19

## 2017-11-06 DIAGNOSIS — E11.9 TYPE 2 DIABETES MELLITUS WITHOUT COMPLICATION, WITHOUT LONG-TERM CURRENT USE OF INSULIN (HCC): ICD-10-CM

## 2017-11-06 DIAGNOSIS — I10 ESSENTIAL HYPERTENSION: ICD-10-CM

## 2017-11-06 DIAGNOSIS — K21.9 GASTROESOPHAGEAL REFLUX DISEASE WITHOUT ESOPHAGITIS: ICD-10-CM

## 2017-11-06 RX ORDER — LOSARTAN POTASSIUM 50 MG/1
50 TABLET ORAL DAILY
Qty: 30 TABLET | Refills: 3 | Status: SHIPPED | OUTPATIENT
Start: 2017-11-06 | End: 2019-04-10 | Stop reason: ALTCHOICE

## 2017-11-06 RX ORDER — LEVOTHYROXINE SODIUM 0.05 MG/1
50 TABLET ORAL DAILY
Qty: 30 TABLET | Refills: 1 | Status: SHIPPED | OUTPATIENT
Start: 2017-11-06

## 2017-11-06 RX ORDER — DICYCLOMINE HCL 20 MG
20 TABLET ORAL 3 TIMES DAILY PRN
Qty: 90 TABLET | Refills: 3 | Status: SHIPPED | OUTPATIENT
Start: 2017-11-06

## 2017-11-06 RX ORDER — TOPIRAMATE 50 MG/1
50 TABLET, FILM COATED ORAL DAILY
Qty: 30 TABLET | Refills: 3 | Status: SHIPPED | OUTPATIENT
Start: 2017-11-06 | End: 2020-06-08

## 2017-11-06 RX ORDER — METOPROLOL TARTRATE 50 MG/1
50 TABLET, FILM COATED ORAL
Qty: 60 TABLET | OUTPATIENT
Start: 2017-11-06

## 2017-11-06 RX ORDER — RANITIDINE 150 MG/1
150 TABLET ORAL NIGHTLY
Qty: 60 TABLET | Refills: 3 | Status: SHIPPED | OUTPATIENT
Start: 2017-11-06 | End: 2019-08-15

## 2017-11-06 RX ORDER — ATORVASTATIN CALCIUM 40 MG/1
40 TABLET, FILM COATED ORAL DAILY
Qty: 30 TABLET | Refills: 3 | Status: SHIPPED | OUTPATIENT
Start: 2017-11-06

## 2017-11-28 RX ORDER — METOPROLOL TARTRATE 50 MG/1
50 TABLET, FILM COATED ORAL DAILY
Qty: 60 TABLET | Refills: 3 | Status: SHIPPED | OUTPATIENT
Start: 2017-11-28 | End: 2018-05-30 | Stop reason: ALTCHOICE

## 2017-12-05 ENCOUNTER — OFFICE VISIT (OUTPATIENT)
Dept: CARDIOLOGY CLINIC | Age: 57
End: 2017-12-05

## 2017-12-05 VITALS
SYSTOLIC BLOOD PRESSURE: 138 MMHG | HEIGHT: 60 IN | DIASTOLIC BLOOD PRESSURE: 80 MMHG | HEART RATE: 62 BPM | BODY MASS INDEX: 45.16 KG/M2 | WEIGHT: 230 LBS

## 2017-12-05 DIAGNOSIS — I10 ESSENTIAL HYPERTENSION: Primary | ICD-10-CM

## 2017-12-05 PROCEDURE — 99214 OFFICE O/P EST MOD 30 MIN: CPT | Performed by: INTERNAL MEDICINE

## 2017-12-05 NOTE — PROGRESS NOTES
Malgorzata Brownlee MD        OFFICE  FOLLOWUP NOTE    Chief complaints: patient is here for management of obesity, DM, HTN,IBS,DYSLPIDEMIA  Subjective: patient feels better, no chest pain, no shortness of breath, no dizziness, no palpitations    HPI Jorge Alberto Bragg is a 62 y. o.year old who  has a past medical history of Diabetes ([facility] [address] DM (diabetes mellitus) ([facility] [address] Dyslipidemia; GERD (gastroesophageal reflux disease); H/O cardiovascular stress test; H/O echocardiogram; Hypertension; IBS (irritable bowel syndrome); and Sphincter of Oddi spasm.  and presents for management of obesity, DM, HTN,IBS,DYSLPIDEMIA which are well controlled      Current Outpatient Prescriptions   Medication Sig Dispense Refill    metoprolol tartrate (LOPRESSOR) 50 MG tablet Take 1 tablet by mouth daily 60 tablet 3    atorvastatin (LIPITOR) 40 MG tablet Take 1 tablet by mouth daily 30 tablet 3    losartan (COZAAR) 50 MG tablet Take 1 tablet by mouth daily 30 tablet 3    topiramate (TOPAMAX) 50 MG tablet Take 1 tablet by mouth daily 30 tablet 3    levothyroxine (SYNTHROID) 50 MCG tablet Take 1 tablet by mouth Daily 30 tablet 1    dicyclomine (BENTYL) 20 MG tablet Take 1 tablet by mouth 3 times daily as needed (cramp) 90 tablet 3    metFORMIN (GLUCOPHAGE) 500 MG tablet Take 1 tablet by mouth 2 times daily (with meals) 60 tablet 3    ranitidine (ZANTAC) 150 MG tablet Take 1 tablet by mouth nightly 60 tablet 3    omeprazole (PRILOSEC) 20 MG delayed release capsule TAKE ONE CAPSULE BY MOUTH ONCE DAILY 30 capsule 3    Glucose Blood (BLOOD GLUCOSE TEST STRIPS) STRP To check blood sugar twice daily with current Glucometer:   DX: diabetes type .00 60 strip 11    Lancets MISC To test twice daily  Dx: E 11.9 60 each 11    Blood Glucose Monitoring Suppl (RELION ULTIMA GLUCOSE SYSTEM) W/DEVICE KIT Use to check BS twice daily 1 kit 0    RELION ULTRA THIN LANCETS 30G MISC Check BS twice daily- before breakfast and two  Hours after supper 100 each 3    ibuprofen (ADVIL;MOTRIN) 800 MG tablet Take 1 tablet by mouth every 8 hours as needed for Pain 90 tablet 0    carisoprodol (SOMA) 250 MG tablet Take 250 mg by mouth       No current facility-administered medications for this visit. Allergies: Review of patient's allergies indicates no known allergies. Past Medical History:   Diagnosis Date    Diabetes ([facility] [address]     DM (diabetes mellitus) ([facility] [address]     Dyslipidemia     GERD (gastroesophageal reflux disease)     H/O cardiovascular stress test 06/08/2017    Normal EF 79 % with normal ventricular contractility. ECG portion of stress test is negative for ischemia by diagnostic criteria    H/O echocardiogram 06/08/2017    Difficult study Moderate concentric LVH with normal systolic function. EF is 50 to 55 %, Impaired relaxation compatible with diastolic dysfunction. ( reversed E/A ratio) The left atrium is Mildly dilated.     Hypertension     IBS (irritable bowel syndrome)     Sphincter of Oddi spasm      Past Surgical History:   Procedure Laterality Date    CHOLECYSTECTOMY      CYST REMOVAL      TONSILLECTOMY       Family History   Problem Relation Age of Onset    Stroke Mother     High Blood Pressure Mother     Cancer Father     Other Father     Diabetes Father     High Blood Pressure Father     Stroke Father      Social History   Substance Use Topics    Smoking status: Former Smoker     Types: Cigarettes     Quit date: 2/14/1995    Smokeless tobacco: Never Used    Alcohol use Yes      Comment: rarely      [unfilled]  Review of Systems:   · Constitutional: No Fever or Weight Loss   · Eyes: No Decreased Vision  · ENT: No Headaches, Hearing Loss or Vertigo  · Cardiovascular: No chest pain, dyspnea on exertion, palpitations or loss of consciousness  · Respiratory: No cough or wheezing    · Gastrointestinal: No abdominal pain, appetite loss, blood in stools, constipation, diarrhea or heartburn  · Genitourinary: No dysuria, trouble extremity noted, muscle strength and tone are normal  Skin: no ulcer,no scar,no stasis dermatitis   Neurologic  alert oriented times 3,Cranial nerves II through XII are grossly intact. There were no gross focal neurologic abnormalities. All sensory and motor nerves examined and were normal  Psychiatric: normal mood and affect    No results found for: CKTOTAL, CKMB, CKMBINDEX, TROPONINI  BNP:  No results found for: BNP  PT/INR:  No results found for: PTINR  Lab Results   Component Value Date    LABA1C 7.6 08/31/2017    LABA1C 7.6 05/18/2017     Lab Results   Component Value Date    CHOL 271 (H) 02/14/2017    TRIG 348 (H) 02/14/2017    HDL 30 (L) 02/14/2017    LDLCALC see below 02/14/2017    LDLDIRECT 120 (H) 07/13/2017     Lab Results   Component Value Date    ALT 23 07/13/2017    AST 19 02/14/2017     TSH:  No results found for: TSH    Impression:  Catalina Thorpe is a 62 y. o.year old who  has a past medical history of Diabetes ([facility] [address] DM (diabetes mellitus) ([facility] [address] Dyslipidemia; GERD (gastroesophageal reflux disease); H/O cardiovascular stress test; H/O echocardiogram; Hypertension; IBS (irritable bowel syndrome); and Sphincter of Oddi spasm. and presents with     Plan:  1. OBESITY:lost 32 lbs on adipex, will wait for 6 months and restart as required  2. IBS: stable  3. HTN: stable, continue losartan  4. Dyslipidemia: stable,contineu statins  5. DM: stable, continue metformin  6. Leg swelling: compression socks recommended as it gets better with leg elevationHealth maintenance: exerise and diet  All labs, medications and tests reviewed, continue all other medications of all above medical condition listed as is.     [unfilled]

## 2017-12-20 NOTE — ANESTHESIA PRE-OP
02/14/2017 09:35 AM    CO2 27 02/14/2017 09:35 AM    BUN 11 02/14/2017 09:35 AM    CREATININE 0.6 02/14/2017 09:35 AM    GLUCOSE 158 (H) 02/14/2017 09:35 AM           Cardiac testing  EKG 5/2017    SR    Echo  6/2017  EF 50-55%   Technically Difficult Study.   Moderate concentric LVH with normal systolic function.    Impaired relaxation compatible with diastolic dysfunction. ( reversed E/A  ratio)   The left atrium is Mildly dilated.   No significant valvular abnormalities.   No evidence of pericardial effusion. Assessment:  · Beta-blockers: YES  · NPO- will confirm prior to surgery  · Physical status:  2  (estimated)    Anesthesia Evaluation will follow by a certified practitioner prior to surgery. The above is a review of chart documents at the time stamped below. Physical exam, status determination, and specific technique is deferred to the actual anesthesia providers involved in the patients care.    =================  Addendum =================  Patient records were reviewed and the patient seen and evaluated. Most Recent Results:  BP (!) 169/80   Pulse 67   Temp 97.1 °F (36.2 °C) (Temporal)   Resp 16   Ht 5' (1.524 m)   Wt 227 lb (103 kg)   SpO2 100%   BMI 44.33 kg/m²      Lab Results   Component Value Date/Time    WBC 7.8 02/14/2017 09:35 AM    HGB 13.7 02/14/2017 09:35 AM    HCT 43.2 02/14/2017 09:35 AM     02/14/2017 09:35 AM     02/14/2017 09:35 AM    K 4.7 02/14/2017 09:35 AM     02/14/2017 09:35 AM    CO2 27 02/14/2017 09:35 AM    BUN 11 02/14/2017 09:35 AM    CREATININE 0.6 02/14/2017 09:35 AM    GLUCOSE 158 (H) 02/14/2017 09:35 AM       Anesthesiology focused physical examination:  MP2. Full dentures in-situ  RRR without murmur  Lungs clear    Assessment/Plan:  NPO status confirmed  Metoprolol @ 0700 this AM  Plan MAC/TIVA    PS 2    Anesthesia procedures discussed - all questions answered.   Information/plan discussed with CRNA involved in the anesthesia

## 2017-12-21 ENCOUNTER — HOSPITAL ENCOUNTER (OUTPATIENT)
Dept: SURGERY | Age: 57
Discharge: HOME | End: 2017-12-21
Attending: INTERNAL MEDICINE | Admitting: INTERNAL MEDICINE

## 2017-12-21 VITALS
HEIGHT: 60 IN | TEMPERATURE: 97 F | DIASTOLIC BLOOD PRESSURE: 64 MMHG | RESPIRATION RATE: 16 BRPM | WEIGHT: 227 LBS | SYSTOLIC BLOOD PRESSURE: 115 MMHG | BODY MASS INDEX: 44.57 KG/M2 | HEART RATE: 58 BPM | OXYGEN SATURATION: 100 %

## 2017-12-21 LAB — GLUCOSE BLD-MCNC: 111 MG/DL (ref 70–99)

## 2017-12-21 RX ORDER — SODIUM CHLORIDE, SODIUM LACTATE, POTASSIUM CHLORIDE, CALCIUM CHLORIDE 600; 310; 30; 20 MG/100ML; MG/100ML; MG/100ML; MG/100ML
INJECTION, SOLUTION INTRAVENOUS CONTINUOUS
Status: DISCONTINUED | OUTPATIENT
Start: 2017-12-21 | End: 2017-12-22 | Stop reason: HOSPADM

## 2017-12-21 RX ADMIN — SODIUM CHLORIDE, SODIUM LACTATE, POTASSIUM CHLORIDE, CALCIUM CHLORIDE: 600; 310; 30; 20 INJECTION, SOLUTION INTRAVENOUS at 09:18

## 2017-12-21 ASSESSMENT — PAIN - FUNCTIONAL ASSESSMENT: PAIN_FUNCTIONAL_ASSESSMENT: 0-10

## 2017-12-21 ASSESSMENT — PAIN SCALES - GENERAL: PAINLEVEL_OUTOF10: 0

## 2017-12-21 NOTE — BRIEF OP NOTE
Brief Postoperative Note    Seabron Dandy  YOB: 1960  [id number]    Pre-operative Diagnosis: family h/o colon cancer  Post-operative Diagnosis: normal    Procedure: colonoscopy    Anesthesia: MAC    Surgeons/Assistants: julius anne    Estimated Blood Loss: less than 50     Complications: None    Specimens: Was Not Obtain    Electronically signed by Blessing Herrera MD on 12/21/2017 at 11:16 AM

## 2017-12-21 NOTE — ANESTHESIA POST-OP
Anesthesia Post-op Note    Patient:    Lexis Rose  MRN:     [id number]   YOB: 1960    Anesthesia Post Evaluation    Final anesthesia type:  MAC-TIVA  Location of evaluation:  [facility]  Patient participation:   complete - patient participated  Level of consciousness:  awake  Pain score:    0  Airway patency:   patent  Nausea & Vomiting:   no nausea and no vomiting  Complications:  no  Cardiovascular status:  blood pressure returned to baseline  Respiratory status:   acceptable  Hydration status:   euvolemic    Ayo Adam MD

## 2017-12-21 NOTE — PROGRESS NOTES
1022 denies nausea, vss,  at bedside, call light in reach, dr Lokesh Shook provided update to  at bedside  1028 head of bed up. Coffee provided  1038 head ache better. Sipping on  Coffee. vss  1046 discharge instructions reviewed.  Verbalized understanding  1050 discharged to car via wheelchair

## 2018-02-23 ENCOUNTER — HOSPITAL ENCOUNTER (OUTPATIENT)
Dept: GENERAL RADIOLOGY | Age: 58
Discharge: HOME | End: 2018-02-23

## 2018-02-23 DIAGNOSIS — M54.41 ACUTE BACK PAIN WITH SCIATICA, RIGHT: ICD-10-CM

## 2018-04-14 ENCOUNTER — TELEPHONE (OUTPATIENT)
Dept: INTERNAL MEDICINE CLINIC | Age: 58
End: 2018-04-14

## 2018-04-14 ENCOUNTER — HOSPITAL ENCOUNTER (OUTPATIENT)
Dept: MRI IMAGING | Age: 58
Discharge: HOME | End: 2018-04-14

## 2018-04-14 DIAGNOSIS — E78.2 MIXED HYPERLIPIDEMIA: ICD-10-CM

## 2018-04-14 DIAGNOSIS — M54.41 ACUTE BACK PAIN WITH SCIATICA, RIGHT: ICD-10-CM

## 2018-04-14 DIAGNOSIS — E11.69 DIABETES MELLITUS TYPE 2 IN OBESE (HCC): ICD-10-CM

## 2018-04-14 DIAGNOSIS — E03.9 ACQUIRED HYPOTHYROIDISM: ICD-10-CM

## 2018-04-14 DIAGNOSIS — E66.9 DIABETES MELLITUS TYPE 2 IN OBESE (HCC): ICD-10-CM

## 2018-04-14 DIAGNOSIS — I10 ESSENTIAL HYPERTENSION: Primary | ICD-10-CM

## 2018-05-30 ENCOUNTER — OFFICE VISIT (OUTPATIENT)
Dept: CARDIOLOGY CLINIC | Age: 58
End: 2018-05-30

## 2018-05-30 VITALS
RESPIRATION RATE: 14 BRPM | BODY MASS INDEX: 48.29 KG/M2 | WEIGHT: 246 LBS | SYSTOLIC BLOOD PRESSURE: 112 MMHG | HEIGHT: 60 IN | HEART RATE: 88 BPM | DIASTOLIC BLOOD PRESSURE: 74 MMHG

## 2018-05-30 DIAGNOSIS — E66.09 CLASS 1 OBESITY DUE TO EXCESS CALORIES WITH SERIOUS COMORBIDITY IN ADULT, UNSPECIFIED BMI: ICD-10-CM

## 2018-05-30 DIAGNOSIS — M79.89 LEG SWELLING: Primary | ICD-10-CM

## 2018-05-30 PROCEDURE — 99214 OFFICE O/P EST MOD 30 MIN: CPT | Performed by: INTERNAL MEDICINE

## 2018-05-30 RX ORDER — PHENTERMINE HYDROCHLORIDE 37.5 MG/1
37.5 TABLET ORAL
Qty: 30 TABLET | Refills: 0 | Status: SHIPPED | OUTPATIENT
Start: 2018-05-30 | End: 2018-06-27 | Stop reason: SDUPTHER

## 2018-06-07 ENCOUNTER — PROCEDURE VISIT (OUTPATIENT)
Dept: CARDIOLOGY CLINIC | Age: 58
End: 2018-06-07

## 2018-06-07 DIAGNOSIS — M79.89 LEG SWELLING: Primary | ICD-10-CM

## 2018-06-07 DIAGNOSIS — E66.09 CLASS 1 OBESITY DUE TO EXCESS CALORIES WITH SERIOUS COMORBIDITY IN ADULT, UNSPECIFIED BMI: ICD-10-CM

## 2018-06-07 PROCEDURE — 93970 EXTREMITY STUDY: CPT | Performed by: INTERNAL MEDICINE

## 2018-06-12 ENCOUNTER — TELEPHONE (OUTPATIENT)
Dept: CARDIOLOGY CLINIC | Age: 58
End: 2018-06-12

## 2018-06-27 ENCOUNTER — OFFICE VISIT (OUTPATIENT)
Dept: CARDIOLOGY CLINIC | Age: 58
End: 2018-06-27

## 2018-06-27 VITALS
DIASTOLIC BLOOD PRESSURE: 72 MMHG | SYSTOLIC BLOOD PRESSURE: 126 MMHG | WEIGHT: 238.2 LBS | HEART RATE: 76 BPM | HEIGHT: 60 IN | BODY MASS INDEX: 46.77 KG/M2

## 2018-06-27 DIAGNOSIS — E66.09 CLASS 1 OBESITY DUE TO EXCESS CALORIES WITH SERIOUS COMORBIDITY IN ADULT, UNSPECIFIED BMI: ICD-10-CM

## 2018-06-27 PROCEDURE — 99214 OFFICE O/P EST MOD 30 MIN: CPT | Performed by: INTERNAL MEDICINE

## 2018-06-27 RX ORDER — PHENTERMINE HYDROCHLORIDE 37.5 MG/1
37.5 TABLET ORAL
Qty: 30 TABLET | Refills: 0 | Status: SHIPPED | OUTPATIENT
Start: 2018-06-27 | End: 2018-07-25 | Stop reason: SDUPTHER

## 2018-07-25 ENCOUNTER — OFFICE VISIT (OUTPATIENT)
Dept: CARDIOLOGY CLINIC | Age: 58
End: 2018-07-25

## 2018-07-25 VITALS
WEIGHT: 237 LBS | DIASTOLIC BLOOD PRESSURE: 70 MMHG | BODY MASS INDEX: 46.53 KG/M2 | HEIGHT: 60 IN | SYSTOLIC BLOOD PRESSURE: 108 MMHG

## 2018-07-25 DIAGNOSIS — E66.09 CLASS 1 OBESITY DUE TO EXCESS CALORIES WITH SERIOUS COMORBIDITY IN ADULT, UNSPECIFIED BMI: ICD-10-CM

## 2018-07-25 PROCEDURE — 99214 OFFICE O/P EST MOD 30 MIN: CPT | Performed by: INTERNAL MEDICINE

## 2018-07-25 RX ORDER — PHENTERMINE HYDROCHLORIDE 37.5 MG/1
37.5 TABLET ORAL
Qty: 30 TABLET | Refills: 0 | Status: SHIPPED | OUTPATIENT
Start: 2018-07-25 | End: 2018-08-24

## 2018-07-25 NOTE — PROGRESS NOTES
Gracia Parker MD        OFFICE  FOLLOWUP NOTE    Chief complaints: patient is here for management of obesity, DM, HTN,IBS,DYSLPIDEMIA, venous reflux  Subjective: patient feels better, no chest pain, no shortness of breath, no dizziness, no palpitations    HPI Rachel Loser is a 62 y. o.year old who  has a past medical history of Diabetes ([place] [address] DM (diabetes mellitus) ([facility] [address] Dyslipidemia; GERD (gastroesophageal reflux disease); H/O cardiovascular stress test; H/O Doppler ultrasound (Venous Duplex, lower extremities); H/O echocardiogram; Hypertension; IBS (irritable bowel syndrome); and Sphincter of Oddi spasm. and presents for management of obesity, DM, HTN,IBS,DYSLPIDEMIA, venous reflux which are well controlled  She only lost only 1 lb,    Current Outpatient Prescriptions   Medication Sig Dispense Refill    phentermine (ADIPEX-P) 37.5 MG tablet Take 1 tablet by mouth every morning (before breakfast) for 30 days. . 30 tablet 0    Elastic Bandages & Supports (MEDICAL COMPRESSION THIGH HIGH) MISC 1 Package by Does not apply route daily Wear daily and remove nightly   20 - 30 mmgh 1 each 0    atorvastatin (LIPITOR) 40 MG tablet Take 1 tablet by mouth daily 30 tablet 3    losartan (COZAAR) 50 MG tablet Take 1 tablet by mouth daily 30 tablet 3    topiramate (TOPAMAX) 50 MG tablet Take 1 tablet by mouth daily 30 tablet 3    levothyroxine (SYNTHROID) 50 MCG tablet Take 1 tablet by mouth Daily 30 tablet 1    dicyclomine (BENTYL) 20 MG tablet Take 1 tablet by mouth 3 times daily as needed (cramp) 90 tablet 3    ranitidine (ZANTAC) 150 MG tablet Take 1 tablet by mouth nightly 60 tablet 3    omeprazole (PRILOSEC) 20 MG delayed release capsule TAKE ONE CAPSULE BY MOUTH ONCE DAILY 30 capsule 3    Glucose Blood (BLOOD GLUCOSE TEST STRIPS) STRP To check blood sugar twice daily with current Glucometer:   DX: diabetes type .00 60 strip 11    Blood Glucose Monitoring Suppl ([facility])

## 2018-08-22 ENCOUNTER — OFFICE VISIT (OUTPATIENT)
Dept: CARDIOLOGY CLINIC | Age: 58
End: 2018-08-22

## 2018-08-22 VITALS
HEIGHT: 60 IN | SYSTOLIC BLOOD PRESSURE: 138 MMHG | WEIGHT: 234 LBS | DIASTOLIC BLOOD PRESSURE: 82 MMHG | HEART RATE: 88 BPM | BODY MASS INDEX: 45.94 KG/M2 | RESPIRATION RATE: 16 BRPM

## 2018-08-22 DIAGNOSIS — E66.01 MORBID OBESITY (HCC): Primary | ICD-10-CM

## 2018-08-22 PROCEDURE — 99214 OFFICE O/P EST MOD 30 MIN: CPT | Performed by: INTERNAL MEDICINE

## 2018-08-22 NOTE — PROGRESS NOTES
Weight Loss   · Eyes: No Decreased Vision  · ENT: No Headaches, Hearing Loss or Vertigo  · Cardiovascular: No chest pain, dyspnea on exertion, palpitations or loss of consciousness  · Respiratory: No cough or wheezing    · Gastrointestinal: No abdominal pain, appetite loss, blood in stools, constipation, diarrhea or heartburn  · Genitourinary: No dysuria, trouble voiding, or hematuria  · Musculoskeletal:  No gait disturbance, weakness or joint complaints  · Integumentary: No rash or pruritis  · Neurological: No TIA or stroke symptoms  · Psychiatric: No anxiety or depression  · Endocrine: No malaise, fatigue or temperature intolerance  · Hematologic/Lymphatic: No bleeding problems, blood clots or swollen lymph nodes  · Allergic/Immunologic: No nasal congestion or hives  All systems negative except as marked. Objective:  /82 (Site: Left Arm, Position: Sitting, Cuff Size: Large Adult)   Pulse 88   Resp 16   Ht 5' (1.524 m)   Wt 234 lb (106.1 kg)   BMI 45.70 kg/m²   Wt Readings from Last 3 Encounters:   08/22/18 234 lb (106.1 kg)   07/25/18 237 lb (107.5 kg)   06/27/18 238 lb 3.2 oz (108 kg)     Body mass index is 45.7 kg/m². GENERAL - Alert, oriented, pleasant, in no apparent distress,normal grooming  HEENT  pupils are reactive to light and accomodation, cornea intact, conjunctive normal color, ears are normal in exam,throat exam in normal, teeth, gum and palate are normal, oral mucosa is normal without any notation of pallor or cyanosis  Neck - Supple. No jugular venous distention noted. No carotid bruits, no apical -carotid delay  Respiratory:  Normal breath sounds, No respiratory distress, No wheezing, No chest tenderness. ,no use of accessory muscles, diaphragm movement is normal  Cardiovascular: (PMI) apex non displaced,no lifts no thrills, no s3,no s4, Normal heart rate, Normal rhythm, No murmurs, No rubs, No gallops.  Carotid arteries pulse and amplitude are normal no bruit, no abdominal bruit noted ( normal abdominal aorta ausculation), femoral arteries pulse and amplitude are normal no bruit, pedal pulses are normal  Femoral pulses have normal amplitude, no bruits   Extremities - No cyanosis, clubbing, or significant edema, no varicose veins    Abdomen  No masses, tenderness, or organomegaly, no hepato-splenomegally, no bruits  Musculoskeletal  No significant edema, no kyphosis or scoliosis, no deformity in any extremity noted, muscle strength and tone are normal  Skin: no ulcer,no scar,no stasis dermatitis   Neurologic  alert oriented times 3,Cranial nerves II through XII are grossly intact. There were no gross focal neurologic abnormalities. All sensory and motor nerves examined and were normal  Psychiatric: normal mood and affect    No results found for: CKTOTAL, CKMB, CKMBINDEX, TROPONINI  BNP:  No results found for: BNP  PT/INR:  No results found for: PTINR  Lab Results   Component Value Date    LABA1C 7.6 08/31/2017    LABA1C 7.6 05/18/2017     Lab Results   Component Value Date    CHOL 271 (H) 02/14/2017    TRIG 348 (H) 02/14/2017    HDL 30 (L) 02/14/2017    LDLCALC see below 02/14/2017    LDLDIRECT 120 (H) 07/13/2017     Lab Results   Component Value Date    ALT 23 07/13/2017    AST 19 02/14/2017     TSH:  No results found for: TSH    Impression:  Flo Haque is a 62 y. o.year old who  has a past medical history of Diabetes ([facility] [address] DM (diabetes mellitus) ([facility] [address] Dyslipidemia; GERD (gastroesophageal reflux disease); H/O cardiovascular stress test; H/O Doppler ultrasound (Venous Duplex, lower extremities); H/O echocardiogram; Hypertension; IBS (irritable bowel syndrome); and Sphincter of Oddi spasm. and presents with     Plan:  1. OBESITY: adipex completed, add qsymia, patient is reluctant for gastric sleeve. 2. Venous reflux disease:she just have compression socks, will recommend to use it, leg elevations recommended  3. Constipated\": recommend colace and dulcolax  4.  Leg pain and swelling: venous doppler ordered  5. IBS: stable  6. HTN: stable, continue losartan  7. Dyslipidemia: stable,contineu statins  DM: stable, continue metforminAll labs, medications and tests reviewed, continue all other medications of all above medical condition listed as is.

## 2018-08-29 ENCOUNTER — TELEPHONE (OUTPATIENT)
Dept: CARDIOLOGY CLINIC | Age: 58
End: 2018-08-29

## 2018-09-19 ENCOUNTER — OFFICE VISIT (OUTPATIENT)
Dept: CARDIOLOGY CLINIC | Age: 58
End: 2018-09-19

## 2018-09-19 VITALS
BODY MASS INDEX: 46.13 KG/M2 | RESPIRATION RATE: 16 BRPM | WEIGHT: 235 LBS | HEIGHT: 60 IN | SYSTOLIC BLOOD PRESSURE: 126 MMHG | DIASTOLIC BLOOD PRESSURE: 84 MMHG | HEART RATE: 76 BPM

## 2018-09-19 DIAGNOSIS — E78.5 DYSLIPIDEMIA: Primary | ICD-10-CM

## 2018-09-19 DIAGNOSIS — E66.01 CLASS 2 SEVERE OBESITY DUE TO EXCESS CALORIES WITH SERIOUS COMORBIDITY AND BODY MASS INDEX (BMI) OF 35.0 TO 35.9 IN ADULT (HCC): ICD-10-CM

## 2018-09-19 DIAGNOSIS — I10 ESSENTIAL HYPERTENSION: ICD-10-CM

## 2018-09-19 PROCEDURE — 99214 OFFICE O/P EST MOD 30 MIN: CPT | Performed by: INTERNAL MEDICINE

## 2018-09-19 NOTE — PROGRESS NOTES
medications for this visit. Allergies: Patient has no known allergies. Past Medical History:   Diagnosis Date    Diabetes ([facility] [address]     DM (diabetes mellitus) ([facility] [address]     Dyslipidemia     GERD (gastroesophageal reflux disease)     H/O cardiovascular stress test 06/08/2017    Normal EF 79 % with normal ventricular contractility. ECG portion of stress test is negative for ischemia by diagnostic criteria    H/O Doppler ultrasound (Venous Duplex, lower extremities) 06/07/2018    No evidence of DVT or SVT in the bilateral common femoral vein, femoral vein, popliteal vein, greater saphenous vein or small saphenous vein. Significant reflux noted in the Left CVF 1.1sec, GSV at [facility] .0.6sec.    H/O echocardiogram 06/08/2017; 6/7/2018    Difficult study Moderate concentric LVH with normal systolic function. EF is 50 to 55 %, Impaired relaxation compatible with diastolic dysfunction. ( reversed E/A ratio) The left atrium is Mildly dilated.     Hypertension     IBS (irritable bowel syndrome)     Sphincter of Oddi spasm      Past Surgical History:   Procedure Laterality Date    CHOLECYSTECTOMY      CYST REMOVAL      TONSILLECTOMY       Family History   Problem Relation Age of Onset    Stroke Mother     High Blood Pressure Mother     Cancer Father     Other Father     Diabetes Father     High Blood Pressure Father     Stroke Father      Social History   Substance Use Topics    Smoking status: Former Smoker     Types: Cigarettes     Quit date: 2/14/1995    Smokeless tobacco: Never Used    Alcohol use Yes      Comment: rarely      [unfilled]  Review of Systems:   · Constitutional: No Fever or Weight Loss   · Eyes: No Decreased Vision  · ENT: No Headaches, Hearing Loss or Vertigo  · Cardiovascular: No chest pain, dyspnea on exertion, palpitations or loss of consciousness  · Respiratory: No cough or wheezing    · Gastrointestinal: No abdominal pain, appetite loss, blood in stools, constipation, diarrhea or no hepato-splenomegally, no bruits  Musculoskeletal  No significant edema, no kyphosis or scoliosis, no deformity in any extremity noted, muscle strength and tone are normal  Skin: no ulcer,no scar,no stasis dermatitis   Neurologic+ alert oriented times 3,Cranial nerves II through XII are grossly intact. There were no gross focal neurologic abnormalities. All sensory and motor nerves examined and were normal  Psychiatric: normal mood and affect    No results found for: CKTOTAL, CKMB, CKMBINDEX, TROPONINI  BNP:  No results found for: BNP  PT/INR:  No results found for: PTINR  Lab Results   Component Value Date    LABA1C 7.6 08/31/2017    LABA1C 7.6 05/18/2017     Lab Results   Component Value Date    CHOL 271 (H) 02/14/2017    TRIG 348 (H) 02/14/2017    HDL 30 (L) 02/14/2017    LDLCALC see below 02/14/2017    LDLDIRECT 120 (H) 07/13/2017     Lab Results   Component Value Date    ALT 23 07/13/2017    AST 19 02/14/2017     TSH:  No results found for: TSH    Impression:  Basilia Morrow is a 62 y. o.year old who  has a past medical history of Diabetes ([facility] [address] DM (diabetes mellitus) ([facility] [address] Dyslipidemia; GERD (gastroesophageal reflux disease); H/O cardiovascular stress test; H/O Doppler ultrasound (Venous Duplex, lower extremities); H/O echocardiogram; Hypertension; IBS (irritable bowel syndrome); and Sphincter of Oddi spasm. and presents with     Plan:  1. OBESITY:recommend to continue portion control,adipex completed, could not purchase qsymia, patient is reluctant for gastric sleeve. 2. Paresthesia: from topamax, will recommend to change it  3. Venous reflux disease:she just have compression socks, will recommend to use it, leg elevations recommended  4. Constipated\": recommend colace and dulcolax  5. Leg pain and swelling: venous doppler ordered  6. IBS: stable  7. HTN: stable, continue losartan  8.  Dyslipidemia: stable,contineu statins  DM: stable, continue metforminedications of all above medical condition listed as

## 2018-10-08 RX ORDER — OMEPRAZOLE 40 MG/1
CAPSULE, DELAYED RELEASE ORAL DAILY
COMMUNITY

## 2018-10-08 NOTE — PROGRESS NOTES
.   1. Do not eat or drink anything after 12 midnight - unless instructed by your doctor prior to surgery. This includes no water, chewing gum or mints. 2. Follow your directions as prescribed by the doctor for your procedure and medications. Pt states does not take BP med early morning, will take after procedure   3. Check with your Doctor regarding stopping Plavix, Coumadin, Lovenox,Effient,Pradaxa,Xarelto, Fragmin or other blood thinners and follow their instructions. 4. Do not smoke, and do not drink any alcoholic beverages 24 hours prior to surgery. This includes NA Beer. 5. You may brush your teeth and gargle the morning of surgery. DO NOT SWALLOW WATER   6. You MUST make arrangements for a responsible adult to take you home after your surgery and be able to check on you every couple hours for the day. You will not be allowed to leave alone or drive yourself home. It is strongly suggested someone stay with you the first 24 hrs. Your surgery will be cancelled if you do not have a ride home. 7. Please wear simple, loose fitting clothing to the hospital.  Kelsey Brought not bring valuables (money, credit cards, checkbooks, etc.) Do not wear any makeup (including no eye makeup) or nail polish on your fingers or toes. 8. DO NOT wear any jewelry or piercings on day of surgery. All body piercing jewelry must be removed. 9. If you have dentures, they will be removed before going to the OR; we will provide you a container. If you wear contact lenses or glasses, they will be removed; please bring a case for them. 10. If you  have a Living Will and Durable Power of  for Healthcare, please bring in a copy. 11 Please bring picture ID,  insurance card, paperwork from the doctors office    (H & P, Consent, & card for implantable devices).

## 2018-10-09 ENCOUNTER — ANESTHESIA EVENT (OUTPATIENT)
Dept: OPERATING ROOM | Age: 58
End: 2018-10-09
Payer: COMMERCIAL

## 2018-10-09 ENCOUNTER — HOSPITAL ENCOUNTER (OUTPATIENT)
Age: 58
Setting detail: OUTPATIENT SURGERY
Discharge: HOME | End: 2018-10-09
Attending: INTERNAL MEDICINE | Admitting: INTERNAL MEDICINE
Payer: COMMERCIAL

## 2018-10-09 ENCOUNTER — ANESTHESIA (OUTPATIENT)
Dept: OPERATING ROOM | Age: 58
End: 2018-10-09
Payer: COMMERCIAL

## 2018-10-09 VITALS
OXYGEN SATURATION: 98 % | SYSTOLIC BLOOD PRESSURE: 128 MMHG | WEIGHT: 236 LBS | HEIGHT: 60 IN | HEART RATE: 64 BPM | RESPIRATION RATE: 16 BRPM | DIASTOLIC BLOOD PRESSURE: 75 MMHG | TEMPERATURE: 97.5 F | BODY MASS INDEX: 46.33 KG/M2

## 2018-10-09 VITALS — SYSTOLIC BLOOD PRESSURE: 190 MMHG | DIASTOLIC BLOOD PRESSURE: 87 MMHG | OXYGEN SATURATION: 96 %

## 2018-10-09 LAB — GLUCOSE BLD-MCNC: 124 MG/DL (ref 70–99)

## 2018-10-09 PROCEDURE — 2709999900 HC NON-CHARGEABLE SUPPLY: Performed by: INTERNAL MEDICINE

## 2018-10-09 PROCEDURE — 82962 GLUCOSE BLOOD TEST: CPT

## 2018-10-09 PROCEDURE — 6360000002 HC RX W HCPCS: Performed by: NURSE ANESTHETIST, CERTIFIED REGISTERED

## 2018-10-09 PROCEDURE — 7100000010 HC PHASE II RECOVERY - FIRST 15 MIN: Performed by: INTERNAL MEDICINE

## 2018-10-09 PROCEDURE — 2580000003 HC RX 258: Performed by: INTERNAL MEDICINE

## 2018-10-09 PROCEDURE — 2500000003 HC RX 250 WO HCPCS: Performed by: NURSE ANESTHETIST, CERTIFIED REGISTERED

## 2018-10-09 PROCEDURE — 3609012800 HC EGD DIAGNOSTIC ONLY: Performed by: INTERNAL MEDICINE

## 2018-10-09 PROCEDURE — 3700000000 HC ANESTHESIA ATTENDED CARE: Performed by: INTERNAL MEDICINE

## 2018-10-09 PROCEDURE — 7100000011 HC PHASE II RECOVERY - ADDTL 15 MIN: Performed by: INTERNAL MEDICINE

## 2018-10-09 PROCEDURE — 3700000001 HC ADD 15 MINUTES (ANESTHESIA): Performed by: INTERNAL MEDICINE

## 2018-10-09 RX ORDER — PROPOFOL 10 MG/ML
INJECTION, EMULSION INTRAVENOUS PRN
Status: DISCONTINUED | OUTPATIENT
Start: 2018-10-09 | End: 2018-10-09 | Stop reason: SDUPTHER

## 2018-10-09 RX ORDER — LIDOCAINE HYDROCHLORIDE 20 MG/ML
INJECTION, SOLUTION EPIDURAL; INFILTRATION; INTRACAUDAL; PERINEURAL PRN
Status: DISCONTINUED | OUTPATIENT
Start: 2018-10-09 | End: 2018-10-09 | Stop reason: SDUPTHER

## 2018-10-09 RX ORDER — SODIUM CHLORIDE, SODIUM LACTATE, POTASSIUM CHLORIDE, CALCIUM CHLORIDE 600; 310; 30; 20 MG/100ML; MG/100ML; MG/100ML; MG/100ML
INJECTION, SOLUTION INTRAVENOUS CONTINUOUS
Status: DISCONTINUED | OUTPATIENT
Start: 2018-10-09 | End: 2018-10-09 | Stop reason: HOSPADM

## 2018-10-09 RX ADMIN — PROPOFOL 30 MG: 10 INJECTION, EMULSION INTRAVENOUS at 08:28

## 2018-10-09 RX ADMIN — PROPOFOL 80 MG: 10 INJECTION, EMULSION INTRAVENOUS at 08:25

## 2018-10-09 RX ADMIN — SODIUM CHLORIDE, POTASSIUM CHLORIDE, SODIUM LACTATE AND CALCIUM CHLORIDE: 600; 310; 30; 20 INJECTION, SOLUTION INTRAVENOUS at 08:14

## 2018-10-09 RX ADMIN — PROPOFOL 50 MG: 10 INJECTION, EMULSION INTRAVENOUS at 08:24

## 2018-10-09 RX ADMIN — LIDOCAINE HYDROCHLORIDE 100 MG: 20 INJECTION, SOLUTION EPIDURAL; INFILTRATION; INTRACAUDAL; PERINEURAL at 08:24

## 2018-10-09 ASSESSMENT — PAIN SCALES - GENERAL
PAINLEVEL_OUTOF10: 0
PAINLEVEL_OUTOF10: 0

## 2018-10-09 ASSESSMENT — PAIN - FUNCTIONAL ASSESSMENT: PAIN_FUNCTIONAL_ASSESSMENT: 0-10

## 2018-10-09 NOTE — BRIEF OP NOTE
Brief Postoperative Note    Nelly Earnest  YOB: 1960  [id number]    Pre-operative Diagnosis: ***    Post-operative Diagnosis: Same    Procedure: ***    Anesthesia: {Anesthesia Types:80839}    Surgeons/Assistants: ***    Estimated Blood Loss: {NUMBERS; FNC:47819}    Complications: {Symptoms;  Intra-op complications:82249}    Specimens: {WAS / WAS OMP:19395}    Findings: ***    Electronically signed by Adrien Jade MD on 10/9/2018 at 8:43 AM

## 2018-11-21 DIAGNOSIS — K21.9 GASTROESOPHAGEAL REFLUX DISEASE WITHOUT ESOPHAGITIS: ICD-10-CM

## 2018-11-23 RX ORDER — RANITIDINE 150 MG/1
TABLET ORAL
Qty: 60 TABLET | Refills: 3
Start: 2018-11-23

## 2019-02-14 LAB
ALT SERPL-CCNC: 48 U/L
AST SERPL-CCNC: 32 U/L
AVERAGE GLUCOSE: 171
BUN BLDV-MCNC: 15 MG/DL
CALCIUM SERPL-MCNC: 9.6 MG/DL
CHLORIDE BLD-SCNC: 103 MMOL/L
CHOLESTEROL, TOTAL: 186 MG/DL
CHOLESTEROL/HDL RATIO: 5.2
CO2: 27 MMOL/L
CREAT SERPL-MCNC: 0.7 MG/DL
GFR CALCULATED: NORMAL
GLUCOSE BLD-MCNC: 130 MG/DL
HBA1C MFR BLD: 7.6 %
HDLC SERPL-MCNC: 37 MG/DL (ref 35–70)
LDL CHOLESTEROL CALCULATED: 111 MG/DL (ref 0–160)
POTASSIUM SERPL-SCNC: 4 MMOL/L
SODIUM BLD-SCNC: 143 MMOL/L
TRIGL SERPL-MCNC: 195 MG/DL
TSH SERPL DL<=0.05 MIU/L-ACNC: 3.26 UIU/ML
VLDLC SERPL CALC-MCNC: 39 MG/DL

## 2019-04-10 ENCOUNTER — OFFICE VISIT (OUTPATIENT)
Dept: CARDIOLOGY CLINIC | Age: 59
End: 2019-04-10
Payer: COMMERCIAL

## 2019-04-10 VITALS
WEIGHT: 257 LBS | HEART RATE: 91 BPM | HEIGHT: 60 IN | DIASTOLIC BLOOD PRESSURE: 72 MMHG | BODY MASS INDEX: 50.45 KG/M2 | SYSTOLIC BLOOD PRESSURE: 140 MMHG | RESPIRATION RATE: 16 BRPM

## 2019-04-10 DIAGNOSIS — R07.9 CHEST PAIN, UNSPECIFIED TYPE: Primary | ICD-10-CM

## 2019-04-10 DIAGNOSIS — E66.01 MORBID OBESITY (HCC): ICD-10-CM

## 2019-04-10 DIAGNOSIS — R00.2 PALPITATIONS: ICD-10-CM

## 2019-04-10 PROCEDURE — 93000 ELECTROCARDIOGRAM COMPLETE: CPT | Performed by: INTERNAL MEDICINE

## 2019-04-10 PROCEDURE — 99214 OFFICE O/P EST MOD 30 MIN: CPT | Performed by: INTERNAL MEDICINE

## 2019-04-10 RX ORDER — OLMESARTAN MEDOXOMIL 20 MG/1
20 TABLET ORAL DAILY
COMMUNITY
Start: 2019-03-07 | End: 2019-06-13

## 2019-04-10 RX ORDER — NAPROXEN 500 MG/1
500 TABLET ORAL DAILY PRN
COMMUNITY
End: 2019-05-02

## 2019-04-10 NOTE — PROGRESS NOTES
Enrrique Meléndez MD        OFFICE  FOLLOWUP NOTE    Chief complaints: patient is here for management of  obesity, DM, HTN,IBS,DYSLPIDEMIA, venous reflux      Subjective: patient feels better, no chest pain, no shortness of breath, no dizziness, no palpitations    HPI Lurlene Friend is a 62 y. o.year old who  has a past medical history of Diabetes ([address] DM (diabetes mellitus) ([facility] [address] Dyslipidemia, GERD (gastroesophageal reflux disease), H/O cardiovascular stress test, H/O Doppler ultrasound (Venous Duplex, lower extremities), H/O echocardiogram, Hypertension, IBS (irritable bowel syndrome), and Sphincter of Oddi spasm.  and presents for management of obesity, DM, HTN,IBS,DYSLPIDEMIA, venous reflux which are well controlled, she gained all her weight back off adipex      Current Outpatient Medications   Medication Sig Dispense Refill    olmesartan (BENICAR) 20 MG tablet Take 20 mg by mouth daily       naproxen (EC NAPROSYN) 500 MG EC tablet Take 500 mg by mouth daily as needed for Pain      metFORMIN (GLUCOPHAGE) 500 MG tablet 500 mg 2 times daily       omeprazole (PRILOSEC) 20 MG delayed release capsule Take 20 mg by mouth daily      atorvastatin (LIPITOR) 40 MG tablet Take 1 tablet by mouth daily 30 tablet 3    topiramate (TOPAMAX) 50 MG tablet Take 1 tablet by mouth daily 30 tablet 3    levothyroxine (SYNTHROID) 50 MCG tablet Take 1 tablet by mouth Daily (Patient taking differently: Take 25 mcg by mouth Daily ) 30 tablet 1    dicyclomine (BENTYL) 20 MG tablet Take 1 tablet by mouth 3 times daily as needed (cramp) (Patient taking differently: Take 20 mg by mouth 3 times daily ) 90 tablet 3    ranitidine (ZANTAC) 150 MG tablet Take 1 tablet by mouth nightly 60 tablet 3    Glucose Blood (BLOOD GLUCOSE TEST STRIPS) STRP To check blood sugar twice daily with current Glucometer:   DX: diabetes type .00 60 strip 11    Blood Glucose Monitoring Suppl (RELION ULTIMA GLUCOSE SYSTEM) W/DEVICE KIT Use to check BS twice daily 1 kit 0    RELION ULTRA THIN LANCETS 30G MISC Check BS twice daily- before breakfast and two  Hours after supper 100 each 3     No current facility-administered medications for this visit. Allergies: Patient has no known allergies. Past Medical History:   Diagnosis Date    Diabetes ([facility] [address]     DM (diabetes mellitus) ([facility] [address]     Dyslipidemia     GERD (gastroesophageal reflux disease)     H/O cardiovascular stress test 06/08/2017    Normal EF 79 % with normal ventricular contractility. ECG portion of stress test is negative for ischemia by diagnostic criteria    H/O Doppler ultrasound (Venous Duplex, lower extremities) 06/07/2018    No evidence of DVT or SVT in the bilateral common femoral vein, femoral vein, popliteal vein, greater saphenous vein or small saphenous vein. Significant reflux noted in the Left CVF 1.1sec, GSV at [facility] .0.6sec.    H/O echocardiogram 06/08/2017; 6/7/2018    Difficult study Moderate concentric LVH with normal systolic function. EF is 50 to 55 %, Impaired relaxation compatible with diastolic dysfunction. ( reversed E/A ratio) The left atrium is Mildly dilated.     Hypertension     IBS (irritable bowel syndrome)     Sphincter of Oddi spasm      Past Surgical History:   Procedure Laterality Date    CHOLECYSTECTOMY      COLONOSCOPY  12/2017    x4 total    CYST REMOVAL      ENDOSCOPY, COLON, DIAGNOSTIC      x2    ENDOSCOPY, COLON, DIAGNOSTIC  10/09/2018    Normal exam    TONSILLECTOMY      UPPER GASTROINTESTINAL ENDOSCOPY N/A 10/9/2018    EGD DIAGNOSTIC ONLY performed by Rishi Jacques MD at [facility] 12     Family History   Problem Relation Age of Onset    Stroke Mother     High Blood Pressure Mother     Cancer Father     Other Father     Diabetes Father     High Blood Pressure Father     Stroke Father      Social History     Tobacco Use    Smoking status: Former Smoker     Types: Cigarettes     Last attempt to quit: 2/14/1995     Years since rhythm, No murmurs, No rubs, No gallops. Carotid arteries pulse and amplitude are normal no bruit, no abdominal bruit noted ( normal abdominal aorta ausculation), femoral arteries pulse and amplitude are normal no bruit, pedal pulses are normal  Femoral pulses have normal amplitude, no bruits   Extremities - No cyanosis, clubbing, or significant edema, no varicose veins    Abdomen - No masses, tenderness, or organomegaly, no hepato-splenomegally, no bruits  Musculoskeletal - No significant edema, no kyphosis or scoliosis, no deformity in any extremity noted, muscle strength and tone are normal  Skin: no ulcer,no scar,no stasis dermatitis   Neurologic - alert oriented times 3,Cranial nerves II through XII are grossly intact. There were no gross focal neurologic abnormalities. All sensory and motor nerves examined and were normal  Psychiatric: normal mood and affect    No results found for: CKTOTAL, CKMB, CKMBINDEX, TROPONINI  BNP:  No results found for: BNP  PT/INR:  No results found for: PTINR  Lab Results   Component Value Date    LABA1C 7.6 08/31/2017    LABA1C 7.6 05/18/2017     Lab Results   Component Value Date    CHOL 271 (H) 02/14/2017    TRIG 348 (H) 02/14/2017    HDL 30 (L) 02/14/2017    LDLCALC see below 02/14/2017    LDLDIRECT 120 (H) 07/13/2017     Lab Results   Component Value Date    ALT 23 07/13/2017    AST 19 02/14/2017     TSH:  No results found for: TSH    Impression:  Russel Mustafa is a 62 y. o.year old who  has a past medical history of Diabetes ([address] DM (diabetes mellitus) ([address] Dyslipidemia, GERD (gastroesophageal reflux disease), H/O cardiovascular stress test, H/O Doppler ultrasound (Venous Duplex, lower extremities), H/O echocardiogram, Hypertension, IBS (irritable bowel syndrome), and Sphincter of Oddi spasm.  and presents with     Plan:  1. OBESITY: refer to weight loss management,recommend to continue portion control,adipex completed, could not purchase qsymia, patient is reluctant for gastric sleeve. 2. Paresthesia: from topamax, will recommend to change it  3. Venous reflux disease:she has CFV reflux,she just have compression socks, will recommend to use it, leg elevations recommended  4. Constipated\": recommend colace and dulcolax  5. IBS: stable  6. HTN: stable, continue losartan  7. Dyslipidemia: stable,contineu statins  8. DM: stable, continue metformin  1. Health maintenance: exerise and diet  All labs, medications and tests reviewed, continue all other medications of all above medical condition listed as is.     [unfilled]

## 2019-04-15 ENCOUNTER — TELEPHONE (OUTPATIENT)
Dept: CARDIOLOGY CLINIC | Age: 59
End: 2019-04-15

## 2019-04-15 NOTE — TELEPHONE ENCOUNTER
Patient stopped in the office. She saw Dr. Morenita Orlando and he told her to go on a liquid diet. He didn't say how long and she wants to eat something. Please call and advise.  Sending to Kwabena titus

## 2019-05-02 ENCOUNTER — OFFICE VISIT (OUTPATIENT)
Dept: BARIATRICS/WEIGHT MGMT | Age: 59
End: 2019-05-02
Payer: COMMERCIAL

## 2019-05-02 ENCOUNTER — TELEPHONE (OUTPATIENT)
Dept: BARIATRICS/WEIGHT MGMT | Age: 59
End: 2019-05-02

## 2019-05-02 VITALS
HEART RATE: 85 BPM | DIASTOLIC BLOOD PRESSURE: 80 MMHG | BODY MASS INDEX: 49.32 KG/M2 | SYSTOLIC BLOOD PRESSURE: 156 MMHG | WEIGHT: 251.2 LBS | HEIGHT: 60 IN

## 2019-05-02 DIAGNOSIS — E66.01 MORBID OBESITY (HCC): ICD-10-CM

## 2019-05-02 DIAGNOSIS — I10 ESSENTIAL HYPERTENSION: Primary | ICD-10-CM

## 2019-05-02 DIAGNOSIS — E11.69 DIABETES MELLITUS TYPE 2 IN OBESE (HCC): ICD-10-CM

## 2019-05-02 DIAGNOSIS — E66.9 DIABETES MELLITUS TYPE 2 IN OBESE (HCC): ICD-10-CM

## 2019-05-02 PROCEDURE — 99204 OFFICE O/P NEW MOD 45 MIN: CPT | Performed by: NURSE PRACTITIONER

## 2019-05-02 RX ORDER — TELMISARTAN 40 MG/1
80 TABLET ORAL DAILY
COMMUNITY

## 2019-05-02 RX ORDER — NAPROXEN SODIUM 220 MG
1-2 TABLET ORAL DAILY PRN
COMMUNITY
End: 2020-01-06 | Stop reason: ALTCHOICE

## 2019-05-02 NOTE — PROGRESS NOTES
Subjective     Chief Complaint   Patient presents with    Bariatric, Initial Visit     New Medication Consult       Vitals:    05/02/19 0954   BP: (!) 156/80   Pulse: 85     Wt Readings from Last 3 Encounters:   05/02/19 251 lb 3.2 oz (113.9 kg)   04/10/19 257 lb (116.6 kg)   10/09/18 236 lb (107 kg)     Weight Loss Program History:  The patient is a 62 y.o. female being seen regarding our weight loss program.  The patient's PCP is Dr. Elzbieta Gomes and presents on new referral.  The patient first recognized that she had a weight problem about 20 years ago. The patient's lowest weight in the last five years was 200 lbs, and the highest weight in the last five years was 264 lbs. Jonathan's current Body mass index is 49.06 kg/m². (5/2/19). HPI:  The patient states she has tried Adipex. These attempts have been partly successful with weight loss, but have failed to sustain adequate weight loss. Patient did well on adipex lost 34 lbs but unfortunately gained all back once stopping. Unable to afford recommended transition weight loss medication. No Psychiatric history. No ETOH use. No hx of seizures. Struggles with skipping meals and grazing. Water intake, low. Hip and back pain. No exercise, states PT made pain worse- declines. Jonathan's life is affected by weight related to below comorbid conditions. The patient has also tried self directed diet and exercise in attempts to sustain weight loss. Comorbid Conditions:  Significant diseases effecting this patient are   Past Medical History:   Diagnosis Date    Diabetes ([facility] [address]     DM (diabetes mellitus) ([facility] [address]     Dyslipidemia     GERD (gastroesophageal reflux disease)     H/O cardiovascular stress test 06/08/2017    Normal EF 79 % with normal ventricular contractility.  ECG portion of stress test is negative for ischemia by diagnostic criteria    H/O Doppler ultrasound (Venous Duplex, lower extremities) 06/07/2018    No evidence of DVT or SVT in the bilateral common femoral vein, femoral vein, popliteal vein, greater saphenous vein or small saphenous vein. Significant reflux noted in the Left CVF 1.1sec, GSV at [facility] .0.6sec.    H/O echocardiogram 06/08/2017; 6/7/2018    Difficult study Moderate concentric LVH with normal systolic function. EF is 50 to 55 %, Impaired relaxation compatible with diastolic dysfunction. ( reversed E/A ratio) The left atrium is Mildly dilated.  Hypertension     IBS (irritable bowel syndrome)     Sphincter of Oddi spasm    . Thoroughly reviewed the patient's medical history, family history, social history and review of systems with the patient today in the office. Please see medical record for pertinent positives.       Allergies:  No Known Allergies    Past Surgical History:  Past Surgical History:   Procedure Laterality Date    CHOLECYSTECTOMY      COLONOSCOPY  12/2017    x4 total    CYST REMOVAL      ENDOSCOPY, COLON, DIAGNOSTIC      x2    ENDOSCOPY, COLON, DIAGNOSTIC  10/09/2018    Normal exam    TONSILLECTOMY      UPPER GASTROINTESTINAL ENDOSCOPY N/A 10/9/2018    EGD DIAGNOSTIC ONLY performed by Troy Silver MD at [facility] 12       Family History:  Family History   Problem Relation Age of Onset    Stroke Mother     High Blood Pressure Mother     Cancer Father     Other Father     Diabetes Father     High Blood Pressure Father     Stroke Father        Social History:  Social History     Socioeconomic History    Marital status:      Spouse name: Not on file    Number of children: 3    Years of education: 12th grade    Highest education level: Not on file   Occupational History    Occupation: none   Social Needs    Financial resource strain: Not on file    Food insecurity:     Worry: Not on file     Inability: Not on file   [employer] needs:     Medical: Not on file     Non-medical: Not on file   Tobacco Use    Smoking status: Former Smoker     Types: Cigarettes     Last attempt to quit: 1995     Years since quittin.2    Smokeless tobacco: Never Used   Substance and Sexual Activity    Alcohol use: Yes     Comment: rarely    Drug use: No    Sexual activity: Not on file   Lifestyle    Physical activity:     Days per week: Not on file     Minutes per session: Not on file    Stress: Not on file   Relationships    Social connections:     Talks on phone: Not on file     Gets together: Not on file     Attends Gnosticist service: Not on file     Active member of club or organization: Not on file     Attends meetings of clubs or organizations: Not on file     Relationship status: Not on file    Intimate partner violence:     Fear of current or ex partner: Not on file     Emotionally abused: Not on file     Physically abused: Not on file     Forced sexual activity: Not on file   Other Topics Concern    Not on file   Social History Narrative    Not on file       Review of Systems - History obtained from the patient. Review of Systems - General ROS: negative for - chills, fever, hot flashes or night sweats  ENT ROS: negative for - headaches, oral lesions, sore throat, vertigo or visual changes  Allergy and Immunology ROS: negative for - hives or nasal congestion  Endocrine ROS: negative for - hair pattern changes, mood swings or polydipsia/polyuria  Respiratory ROS: no cough, shortness of breath, or wheezing  Cardiovascular ROS: no chest pain or dyspnea on exertion  Gastrointestinal ROS: no abdominal pain, change in bowel habits, or black or bloody stools  Genito-Urinary ROS: no dysuria, incontinence, trouble voiding, or hematuria  Musculoskeletal ROS:  Positive for back and hip pain.   Neurological ROS: negative for - behavioral changes, dizziness, headaches, impaired coordination/balance, numbness/tingling or seizures  Dermatological ROS: negative for - eczema or nail changes  Psychological ROS: negative for - anxiety, depression or suicidal ideation     Objective     Physical Exam Constitutional: Oriented to person, place, and time and well-developed, well-nourished, and in no distress. No distress. Obese   HENT:   Head: Normocephalic and atraumatic. Eyes: Conjunctivae are normal. Pupils are equal, round, and reactive to light. Neck: Normal range of motion. Neck supple. Cardiovascular: Normal rate, regular rhythm, normal heart sounds and intact distal pulses. No murmur heard. Pulmonary/Chest: Effort normal and breath sounds normal. No respiratory distress. Abdominal: Soft. Bowel sounds are normal. Exhibits no distension. There is no tenderness. Large. Musculoskeletal: Exhibits no edema or tenderness. Lymphadenopathy: Deferred. Neurological: She is alert and oriented to person, place, and time. No cranial nerve deficit. Skin: Skin is warm. She is not diaphoretic. Psychiatric: Mood, memory, affect and judgment normal.     Assessment  and Plan    Given medication handout today and discussed. Start medication BID and then increase to TID if tolerating well. Take daily with meals and avoid high fat foods. Will increase to orlistat if patient has coverage and tolerates well. Discussed with patient mechanism of inhibiting pancreatic lipases. Inhibiting the absorption of approximately 25-30 percent of calories ingested as far. Discussed side effects in detail cramps, flatulence, fecal incontinence, reduced absorption of fat-soluble vitamins. Contraindications: Hyperthyroidism, glaucoma, ETOH, seizure hx, previous SI/Attempt, MAOI inhibiter therapy use within 14 days, pregnant. Pregnancy: teratogenic (increased risk of oral cleft defects, T1). Adverse effects, hepatotoxicity and patients should be instructed to report any symptoms of hepatic impairment such as anorexia, pruritis, jaundice, and dark urine. Patient aware to d/c immediately. Fat soluble vitamins ADEK can be lowered. Need MVI at bedtime.      Patient informed of importance and compliance with diet plan. Plan    1. Diabetes mellitus type 2 in obese (HCC)  - Stable per patient. Need most recent lab report for A1C.   - Continue metformin daily. 2. Essential hypertension  - Borderline elevated today. Currently on benicar and micardis daily.   - Needs to continue. PCP managing.   - Discussed weight loss and importance of 5-10% reduction in weight. 3. Morbid obesity ([address]  - Needs labs from PCP for evaluation.   - Consult today for weight loss medications, limited due to patients comorbid conditions and financial considerations. - Patient post menopausal.   - Discussed weight loss program with patient and the metabolic components of obesity and insulin resistance over time. - Ultimately will need to change overall eating behaviors to have success in continued management with weight loss. - Will continue to journal food items and discussed the below recommendations to patient. - Encouraged her to weigh daily and work towards a goal of 1-2 pounds weekly. - She is aware that she may schedule at anytime with dietitian or with NP.  - All questions answered and overall appears very receptive.   - Follow up for new medication group class in 2 weeks and RTC 6 wks with NP. Patient was encouraged to journal all food intake using Zoop pal. Keep calorie level at approximately 1500. Protein intake is to be a minimum of 60 grams per day. Water drinking was encouraged with a goal of 64oz-128oz daily. Beverages to be calorie free except for milk. Every other beverage should be water. They are to avoid soda. Continue to increase level of physical activity.     I spent 45 minutes with the patient today and More than 50% of the office visit today was spent in face to face counseling regarding diet and exercise, weight loss medication, counting calories, complying with the diet recommendations, and complying with the work up of dietary counseling and exercise physiologist counseling. Patient counseled on the benefits of treatment plan at length while in the office today. Patient states an understanding and willingness to proceed with the recommended weight loss plan. No orders of the defined types were placed in this encounter. No orders of the defined types were placed in this encounter. Follow Up:  Return in about 2 weeks (around 5/16/2019).     Jevon Piña, CNP

## 2019-05-02 NOTE — PROGRESS NOTES
Weight Loss Program History:  The patient is a 62 y.o. female being seen regarding our weight loss program.  The patient's PCP is Dr. Angeli Su and presents on new referral.  The patient first recognized that she had a weight problem about 20 years ago. The patient's lowest weight in the last five years was 200 lbs, and the highest weight in the last five years was 264 lbs. Jonathan's current Body mass index is 49.06 kg/m². (5/2/19). HPI:  The patient states she has tried Adipex. These attempts have been partly successful with weight loss, but have failed to sustain adequate weight loss.

## 2019-05-15 ENCOUNTER — OFFICE VISIT (OUTPATIENT)
Dept: BARIATRICS/WEIGHT MGMT | Age: 59
End: 2019-05-15
Payer: COMMERCIAL

## 2019-05-15 VITALS
HEART RATE: 74 BPM | DIASTOLIC BLOOD PRESSURE: 63 MMHG | WEIGHT: 251.5 LBS | HEIGHT: 60 IN | SYSTOLIC BLOOD PRESSURE: 145 MMHG | BODY MASS INDEX: 49.38 KG/M2

## 2019-05-15 DIAGNOSIS — E66.01 MORBID OBESITY WITH BMI OF 45.0-49.9, ADULT (HCC): ICD-10-CM

## 2019-05-15 DIAGNOSIS — I10 ESSENTIAL HYPERTENSION: Primary | ICD-10-CM

## 2019-05-15 PROCEDURE — 99215 OFFICE O/P EST HI 40 MIN: CPT | Performed by: NURSE PRACTITIONER

## 2019-05-15 RX ORDER — SWAB
1 SWAB, NON-MEDICATED MISCELLANEOUS 2 TIMES DAILY
Qty: 60 CAPSULE | Refills: 5 | Status: SHIPPED | OUTPATIENT
Start: 2019-05-15 | End: 2020-01-06 | Stop reason: ALTCHOICE

## 2019-05-15 ASSESSMENT — ENCOUNTER SYMPTOMS
EYE PAIN: 0
BACK PAIN: 1
NAUSEA: 0
DIARRHEA: 0
SHORTNESS OF BREATH: 0
WHEEZING: 0
RHINORRHEA: 0
ABDOMINAL PAIN: 0
ABDOMINAL DISTENTION: 0
CHEST TIGHTNESS: 0
TROUBLE SWALLOWING: 0

## 2019-05-15 NOTE — PROGRESS NOTES
tablet every day by oral route for 90 days.  methylcellulose (CITRUCEL) 500 MG TABS Take 1 tablet by mouth daily      olmesartan (BENICAR) 20 MG tablet Take 20 mg by mouth daily Indications: For High BP, taking til out, then switching to Telmisartan       metFORMIN (GLUCOPHAGE) 500 MG tablet 500 mg 2 times daily       omeprazole (PRILOSEC) 40 MG delayed release capsule Take by mouth daily       atorvastatin (LIPITOR) 40 MG tablet Take 1 tablet by mouth daily 30 tablet 3    topiramate (TOPAMAX) 50 MG tablet Take 1 tablet by mouth daily 30 tablet 3    levothyroxine (SYNTHROID) 50 MCG tablet Take 1 tablet by mouth Daily (Patient taking differently: Take 25 mcg by mouth Daily ) 30 tablet 1    dicyclomine (BENTYL) 20 MG tablet Take 1 tablet by mouth 3 times daily as needed (cramp) (Patient taking differently: Take 20 mg by mouth 3 times daily ) 90 tablet 3    ranitidine (ZANTAC) 150 MG tablet Take 1 tablet by mouth nightly 60 tablet 3    Glucose Blood (BLOOD GLUCOSE TEST STRIPS) STRP To check blood sugar twice daily with current Glucometer:   DX: diabetes type .00 60 strip 11    Blood Glucose Monitoring Suppl (RELION ULTIMA GLUCOSE SYSTEM) W/DEVICE KIT Use to check BS twice daily 1 kit 0    RELION ULTRA THIN LANCETS 30G MISC Check BS twice daily- before breakfast and two  Hours after supper 100 each 3     No current facility-administered medications for this visit. Family History   Problem Relation Age of Onset    Stroke Mother     High Blood Pressure Mother     Cancer Father     Other Father     Diabetes Father     High Blood Pressure Father     Stroke Father        ALEX Jacinto presents today for new medication group class by this provider. Patient had an initial individual provider consult and is here for their group education class.  At initial consult weight loss medication was decided based on current BMI, prior attempts, and exclusion criteria based on past medical regular rhythm and normal heart sounds. Pulmonary/Chest: Effort normal and breath sounds normal. She has no decreased breath sounds. She has no wheezes. She has no rhonchi. She has no rales. Abdominal: Soft. Bowel sounds are normal. There is no tenderness. Musculoskeletal: Normal range of motion. She exhibits no tenderness. In all 4 extremities. Neurological: She is alert and oriented to person, place, and time. She has normal strength. She exhibits normal muscle tone. GCS eye subscore is 4. GCS verbal subscore is 5. GCS motor subscore is 6. Skin: Skin is warm and dry. No rash noted. Psychiatric: She has a normal mood and affect. Her behavior is normal. Judgment normal.   Nursing note and vitals reviewed. ASSESSMENT/ PLAN:    1. Essential hypertension  - HTN stable today, continue Micardis daily.   - PCP managing. 2. Morbid obesity with BMI of 45.0-49.9, adult ([facility] [address]  - Patient attended weight loss medication group class today. - Patient has already started simon, x3 days. - Discussed possible side effects with patient in length via power point.    - RTC in 1 month with NP.     - Obesity with a BMI of 49.  - Patient educated in depth on defining obesity and the risk factors associated when BMI >30 via power point.     - Recommend MVI Ca/Vit D daily- prescription written. - Pt instructed on healthy diet to support weight loss. Instructed on goal calorie intake, not less than 1,000 kcals daily. Educated on healthy diet framework to include adequate lean protein, non-starchy vegetables, and moderate carbohydrate and fruit intake. - Instructed on fluid intake at least 64 oz daily. Patient instructed to refrain from any calorie enriched drinks. Recommend using meal replacements, 1-3 daily to support maintaining adequate protein and calorie goals. - Patient also educated on celebrate products sold in house and appropriate recommendations for vitamins and meal replacement shakes.    - Patient was also informed on weight loss program and specific requirements to be met by all. Weight loss program contract signed prior. Patient aware of the medication compliance guidelines and will be removed from medication if the patient does not comply and medication handout given today. Patient was seen with total face to face time of 40 minutes in a group setting. More than 50% of this visit was counseling on obesity, strict diet recommendations, exercise, current medication usage, possible side effects, nutrition and education as above in my assessment and plan section of my note. No orders of the defined types were placed in this encounter. Return in about 1 month (around 6/15/2019).     Chantal Brunson CNP

## 2019-06-13 ENCOUNTER — OFFICE VISIT (OUTPATIENT)
Dept: BARIATRICS/WEIGHT MGMT | Age: 59
End: 2019-06-13
Payer: COMMERCIAL

## 2019-06-13 VITALS
BODY MASS INDEX: 50.12 KG/M2 | OXYGEN SATURATION: 97 % | WEIGHT: 255.3 LBS | SYSTOLIC BLOOD PRESSURE: 136 MMHG | DIASTOLIC BLOOD PRESSURE: 84 MMHG | HEIGHT: 60 IN | HEART RATE: 88 BPM

## 2019-06-13 DIAGNOSIS — E66.01 MORBID OBESITY (HCC): ICD-10-CM

## 2019-06-13 DIAGNOSIS — K21.9 GASTROESOPHAGEAL REFLUX DISEASE WITHOUT ESOPHAGITIS: Primary | ICD-10-CM

## 2019-06-13 PROCEDURE — 99213 OFFICE O/P EST LOW 20 MIN: CPT | Performed by: NURSE PRACTITIONER

## 2019-06-13 ASSESSMENT — ENCOUNTER SYMPTOMS
CONSTIPATION: 0
SHORTNESS OF BREATH: 0
CHEST TIGHTNESS: 0
ABDOMINAL PAIN: 0
TROUBLE SWALLOWING: 0
ABDOMINAL DISTENTION: 0
NAUSEA: 0
DIARRHEA: 0
WHEEZING: 0
RHINORRHEA: 0
EYE PAIN: 0
VOMITING: 0

## 2019-06-13 NOTE — PROGRESS NOTES
Melissa Ocasio  1960   62 y.o. SUBJECT EDSON:    Chief Complaint   Patient presents with    Weight Management     Past Medical History:   Diagnosis Date    Diabetes ([facility] [address]     DM (diabetes mellitus) ([facility] [address]     Dyslipidemia     GERD (gastroesophageal reflux disease)     H/O cardiovascular stress test 06/08/2017    Normal EF 79 % with normal ventricular contractility. ECG portion of stress test is negative for ischemia by diagnostic criteria    H/O Doppler ultrasound (Venous Duplex, lower extremities) 06/07/2018    No evidence of DVT or SVT in the bilateral common femoral vein, femoral vein, popliteal vein, greater saphenous vein or small saphenous vein. Significant reflux noted in the Left CVF 1.1sec, GSV at [facility] .0.6sec.    H/O echocardiogram 06/08/2017; 6/7/2018    Difficult study Moderate concentric LVH with normal systolic function. EF is 50 to 55 %, Impaired relaxation compatible with diastolic dysfunction. ( reversed E/A ratio) The left atrium is Mildly dilated.     Hypertension     IBS (irritable bowel syndrome)     Sphincter of Oddi spasm      Past Surgical History:   Procedure Laterality Date    CHOLECYSTECTOMY      COLONOSCOPY  12/2017    x4 total    CYST REMOVAL      ENDOSCOPY, COLON, DIAGNOSTIC      x2    ENDOSCOPY, COLON, DIAGNOSTIC  10/09/2018    Normal exam    TONSILLECTOMY      UPPER GASTROINTESTINAL ENDOSCOPY N/A 10/9/2018    EGD DIAGNOSTIC ONLY performed by Kevin Wade MD at [facility]     Current Outpatient Medications   Medication Sig Dispense Refill    Multiple Vitamin (MVI, BARIATRIC ADVANTAGE MULTI-FORMULA, CHEW TAB) Take 1 tablet by mouth daily 90 tablet 5    Vitamin D, Cholecalciferol, 400 units CAPS Take 1 tablet by mouth 2 times daily 60 capsule 5    naproxen sodium (ALEVE) 220 MG tablet Take 1-2 tablets by mouth daily as needed for Pain (Hip and Back Pain)      telmisartan (MICARDIS) 40 MG tablet telmisartan 40 mg tablet   Take 1 tablet every day by oral route for 90 days.  methylcellulose (CITRUCEL) 500 MG TABS Take 1 tablet by mouth daily      metFORMIN (GLUCOPHAGE) 500 MG tablet 500 mg 2 times daily       omeprazole (PRILOSEC) 40 MG delayed release capsule Take by mouth daily       atorvastatin (LIPITOR) 40 MG tablet Take 1 tablet by mouth daily 30 tablet 3    topiramate (TOPAMAX) 50 MG tablet Take 1 tablet by mouth daily 30 tablet 3    levothyroxine (SYNTHROID) 50 MCG tablet Take 1 tablet by mouth Daily (Patient taking differently: Take 25 mcg by mouth Daily ) 30 tablet 1    dicyclomine (BENTYL) 20 MG tablet Take 1 tablet by mouth 3 times daily as needed (cramp) (Patient taking differently: Take 20 mg by mouth 3 times daily ) 90 tablet 3    ranitidine (ZANTAC) 150 MG tablet Take 1 tablet by mouth nightly 60 tablet 3    Glucose Blood (BLOOD GLUCOSE TEST STRIPS) STRP To check blood sugar twice daily with current Glucometer:   DX: diabetes type .00 60 strip 11    Blood Glucose Monitoring Suppl (RELION ULTIMA GLUCOSE SYSTEM) W/DEVICE KIT Use to check BS twice daily 1 kit 0    RELION ULTRA THIN LANCETS 30G MISC Check BS twice daily- before breakfast and two  Hours after supper 100 each 3     No current facility-administered medications for this visit. Family History   Problem Relation Age of Onset    Stroke Mother     High Blood Pressure Mother     Cancer Father     Other Father     Diabetes Father     High Blood Pressure Father     Stroke Father        ALEX  Montrell Gibbons presents today for medication management of her chronic comorbid condition obesity. She has been on Matt 3x daily for weight loss for 1 month. Reports having epigastric pain for almost 2 months. States symptoms have been ongoing. No diarrhea, n/v or any other symptoms and she reports normal bowel habits. Reports improvement in symptoms with eating. She is currently on zantac and omeprazole daily. Previous EGD per dayana normal in 10. 18.      Review of Systems Constitutional: Negative. Negative for appetite change, fatigue and fever. HENT: Negative for congestion, dental problem, hearing loss, rhinorrhea and trouble swallowing. Eyes: Negative for pain. Respiratory: Negative for chest tightness, shortness of breath and wheezing. Cardiovascular: Negative for chest pain, palpitations and leg swelling. Gastrointestinal: Negative for abdominal distention, abdominal pain (Epigastric pain. on PPI and h2), constipation, diarrhea, nausea and vomiting. Endocrine: Negative for cold intolerance and polydipsia. Genitourinary: Negative for difficulty urinating and frequency. Musculoskeletal: Negative for arthralgias and gait problem. Skin: Negative for rash. Allergic/Immunologic: Negative for environmental allergies. Neurological: Negative for dizziness, seizures and syncope. Hematological: Does not bruise/bleed easily. Psychiatric/Behavioral: Negative for behavioral problems and suicidal ideas. OBJECTIVE:     /84   Pulse 88   Ht 5' (1.524 m)   Wt 255 lb 4.8 oz (115.8 kg)   SpO2 97%   BMI 49.86 kg/m²   Wt Readings from Last 3 Encounters:   06/13/19 255 lb 4.8 oz (115.8 kg)   05/15/19 251 lb 8 oz (114.1 kg)   05/02/19 251 lb 3.2 oz (113.9 kg)       Physical Exam   Constitutional: She is oriented to person, place, and time. She appears well-developed and well-nourished. Obese   HENT:   Head: Normocephalic and atraumatic. Right Ear: Hearing and ear canal normal.   Left Ear: Hearing and ear canal normal.   Nose: Nose normal.   Mouth/Throat: Uvula is midline and oropharynx is clear and moist.   Eyes: Pupils are equal, round, and reactive to light. Conjunctivae are normal.   Neck: Normal range of motion. Cardiovascular: Normal rate, regular rhythm and normal heart sounds. Pulmonary/Chest: Effort normal and breath sounds normal. She has no decreased breath sounds. She has no wheezes. She has no rhonchi. She has no rales.    Abdominal: Soft. Bowel sounds are normal. There is no hepatosplenomegaly, splenomegaly or hepatomegaly. There is no tenderness. There is no CVA tenderness, no tenderness at McBurney's point and negative Chong's sign. No hernia. Hernia confirmed negative in the ventral area. Musculoskeletal: Normal range of motion. She exhibits no tenderness. In all 4 extremities. Neurological: She is alert and oriented to person, place, and time. She has normal strength. She exhibits normal muscle tone. GCS eye subscore is 4. GCS verbal subscore is 5. GCS motor subscore is 6. Skin: Skin is warm and dry. No rash noted. Psychiatric: She has a normal mood and affect. Her behavior is normal. Judgment normal.   Nursing note and vitals reviewed. ASSESSMENT/ PLAN:    1. Gastroesophageal reflux disease without esophagitis  - Abdominal pain hx consistent with GERD/gastritis. PE negative. After further details, appears patient has GERD prior to starting medication.    - Reviewed most recent EGD in which was normal.   - H. Pylori Antigen, Stool; Future- r/o. - States was to have CT scan per GI.   - Stop naproxen. - Educating on limiting caffeine, spicy and acid food. - Also recommended to not lay within 45 minutes of eating.   - Follow up with GI asap. - Stop simon medication until GERD further assessed. 2. Morbid obesity ([address]  - Patient struggling with weight loss, appears secondary to gerd as only eating carbs/pretzels. - Discussed strict calorie counting.   - Revisit and follow closely with RD until follows up with GI.   - RTC 1 month with NP. Orders Placed This Encounter   Procedures    H. Pylori Antigen, Stool     Standing Status:   Future     Standing Expiration Date:   6/13/2020       Return in about 1 month (around 7/13/2019).     Zainab Graham, CNP

## 2019-06-27 ENCOUNTER — OFFICE VISIT (OUTPATIENT)
Dept: BARIATRICS/WEIGHT MGMT | Age: 59
End: 2019-06-27

## 2019-06-27 VITALS — HEIGHT: 60 IN | BODY MASS INDEX: 49.89 KG/M2 | WEIGHT: 254.1 LBS

## 2019-06-27 DIAGNOSIS — E66.01 MORBID OBESITY WITH BMI OF 45.0-49.9, ADULT (HCC): Primary | ICD-10-CM

## 2019-06-27 PROCEDURE — 99999 PR OFFICE/OUTPT VISIT,PROCEDURE ONLY: CPT

## 2019-06-27 NOTE — PROGRESS NOTES
[facility] - Non-Surgical Weight Loss Program    REASON FOR VISIT: Medication program follow-up    Chief Complaint:    Chief Complaint   Patient presents with    Weight Management       SUBJECTIVE:  Pt here for f/u. Was not tolerating weight loss medication - was having intermittent abd pain - which has improved now, although still present off and on. Pt has lost 1.2 lbs since last visit. Instructed on high protein, reduced carbohydrate meal plan and general healthy diet concepts. Pt had been incorrectly instructed in the past on carbohydrate foods. Provided sample meal plans and foods lists. Pt verbalized understanding. Pt also recommended to start Multi-Well vitamins, purchased today. The patient is a 62 y.o. female being seen for obesity, enrolled in Non-Surgical Weight Loss Program; Jonathan's, Height: 5' (152.4 cm), Weight: 254 lb 1.6 oz (115.3 kg), Current Body mass index is 49.63 kg/m². The patient's PCP is Katherine Cain MD     Comorbid Conditions:  Significant diseases affecting this patient are   Past Medical History:   Diagnosis Date    Diabetes ([facility] [address]     DM (diabetes mellitus) ([facility] [address]     Dyslipidemia     GERD (gastroesophageal reflux disease)     H/O cardiovascular stress test 06/08/2017    Normal EF 79 % with normal ventricular contractility. ECG portion of stress test is negative for ischemia by diagnostic criteria    H/O Doppler ultrasound (Venous Duplex, lower extremities) 06/07/2018    No evidence of DVT or SVT in the bilateral common femoral vein, femoral vein, popliteal vein, greater saphenous vein or small saphenous vein. Significant reflux noted in the Left CVF 1.1sec, GSV at [facility] .0.6sec.    H/O echocardiogram 06/08/2017; 6/7/2018    Difficult study Moderate concentric LVH with normal systolic function. EF is 50 to 55 %, Impaired relaxation compatible with diastolic dysfunction. ( reversed E/A ratio) The left atrium is Mildly dilated.     Hypertension     IBS (irritable bowel syndrome)     Sphincter of Oddi spasm    . Review of Systems - Review of Systems  Otherwise per HPI.     Allergies:  No Known Allergies    Past Surgical History:     Past Surgical History:   Procedure Laterality Date    CHOLECYSTECTOMY      COLONOSCOPY  12/2017    x4 total    CYST REMOVAL      ENDOSCOPY, COLON, DIAGNOSTIC      x2    ENDOSCOPY, COLON, DIAGNOSTIC  10/09/2018    Normal exam    TONSILLECTOMY      UPPER GASTROINTESTINAL ENDOSCOPY N/A 10/9/2018    EGD DIAGNOSTIC ONLY performed by Johny Li MD at [place] [id number]       Family History:  Family History   Problem Relation Age of Onset    Stroke Mother     High Blood Pressure Mother     Cancer Father     Other Father     Diabetes Father     High Blood Pressure Father     Stroke Father        Social History:  Social History     Socioeconomic History    Marital status:      Spouse name: Not on file    Number of children: 3    Years of education: 12th grade    Highest education level: Not on file   Occupational History    Occupation: none   Social Needs    Financial resource strain: Not on file    Food insecurity:     Worry: Not on file     Inability: Not on file   FORVM needs:     Medical: Not on file     Non-medical: Not on file   Tobacco Use    Smoking status: Former Smoker     Types: Cigarettes     Last attempt to quit: 1995     Years since quittin.3    Smokeless tobacco: Never Used   Substance and Sexual Activity    Alcohol use: Yes     Comment: rarely    Drug use: No    Sexual activity: Not on file   Lifestyle    Physical activity:     Days per week: Not on file     Minutes per session: Not on file    Stress: Not on file   Relationships    Social connections:     Talks on phone: Not on file     Gets together: Not on file     Attends [employer] service: Not on file     Active member of club or organization: Not on file     Attends meetings of clubs or organizations: Not on file Relationship status: Not on file    Intimate partner violence:     Fear of current or ex partner: Not on file     Emotionally abused: Not on file     Physically abused: Not on file     Forced sexual activity: Not on file   Other Topics Concern    Not on file   Social History Narrative    Not on file         OBJECTIVE:  Physical Exam   Ht 5' (1.524 m)   Wt 254 lb 1.6 oz (115.3 kg)   BMI 49.63 kg/m²        NUTRITION DIAGNOSIS: Overweight / Obesity   Problem: Increased adiposity compared to reference standard orestablished norm   Etiology: Excess intake compared to output over time   S/S: Ht: 60\" Wt: 254.1 lbs BMI: 49.63    NUTRITION INTERVENTIONS:    Individualized treatment goals to address nutrition diagnosis:   Instructed on 100 gm carb diet, high protein   Provided sample menus, foods lists   Encouraged Physical activity as approved by physician    MONITORING/ EVALUATION/ PLAN:   Pt verbalized understanding of all materials covered   Pt asked pertinent questions throughout the session - expectcompliance with nutrition guidelines presented   Provided pt with contact information should questions arise prior to next visit   Will f/u with pt RUPERTO Newell MS, RDN, LD  6/27/2019

## 2019-07-18 ENCOUNTER — OFFICE VISIT (OUTPATIENT)
Dept: BARIATRICS/WEIGHT MGMT | Age: 59
End: 2019-07-18
Payer: COMMERCIAL

## 2019-07-18 VITALS
SYSTOLIC BLOOD PRESSURE: 132 MMHG | HEIGHT: 60 IN | HEART RATE: 72 BPM | RESPIRATION RATE: 16 BRPM | WEIGHT: 254.8 LBS | BODY MASS INDEX: 50.03 KG/M2 | DIASTOLIC BLOOD PRESSURE: 78 MMHG

## 2019-07-18 DIAGNOSIS — K21.9 GASTROESOPHAGEAL REFLUX DISEASE WITHOUT ESOPHAGITIS: ICD-10-CM

## 2019-07-18 DIAGNOSIS — E66.01 MORBID OBESITY (HCC): Primary | ICD-10-CM

## 2019-07-18 PROCEDURE — 99213 OFFICE O/P EST LOW 20 MIN: CPT | Performed by: NURSE PRACTITIONER

## 2019-07-18 ASSESSMENT — ENCOUNTER SYMPTOMS
EYE PAIN: 0
ABDOMINAL PAIN: 0
WHEEZING: 0
DIARRHEA: 0
SHORTNESS OF BREATH: 0
TROUBLE SWALLOWING: 0
ABDOMINAL DISTENTION: 0
BACK PAIN: 1
CHEST TIGHTNESS: 0
RHINORRHEA: 0
NAUSEA: 0

## 2019-07-18 NOTE — PROGRESS NOTES
Sascha Sharpe  1960   62 y.o. SUBJECT EDSON:    Chief Complaint   Patient presents with    Weight Management     3rd Medication visit, Celebrate MVI x 2 weeks     Past Medical History:   Diagnosis Date    Diabetes ([facility] [address]     DM (diabetes mellitus) ([facility] [address]     Dyslipidemia     GERD (gastroesophageal reflux disease)     H/O cardiovascular stress test 06/08/2017    Normal EF 79 % with normal ventricular contractility. ECG portion of stress test is negative for ischemia by diagnostic criteria    H/O Doppler ultrasound (Venous Duplex, lower extremities) 06/07/2018    No evidence of DVT or SVT in the bilateral common femoral vein, femoral vein, popliteal vein, greater saphenous vein or small saphenous vein. Significant reflux noted in the Left CVF 1.1sec, GSV at [facility] .0.6sec.    H/O echocardiogram 06/08/2017; 6/7/2018    Difficult study Moderate concentric LVH with normal systolic function. EF is 50 to 55 %, Impaired relaxation compatible with diastolic dysfunction. ( reversed E/A ratio) The left atrium is Mildly dilated.     Hypertension     IBS (irritable bowel syndrome)     Sphincter of Oddi spasm      Past Surgical History:   Procedure Laterality Date    CHOLECYSTECTOMY      COLONOSCOPY  12/2017    x4 total    CYST REMOVAL      ENDOSCOPY, COLON, DIAGNOSTIC      x2    ENDOSCOPY, COLON, DIAGNOSTIC  10/09/2018    Normal exam    TONSILLECTOMY      UPPER GASTROINTESTINAL ENDOSCOPY N/A 10/9/2018    EGD DIAGNOSTIC ONLY performed by Noelia Boeck, MD at [facility]     Current Outpatient Medications   Medication Sig Dispense Refill    Multiple Vitamin (MVI, BARIATRIC ADVANTAGE MULTI-FORMULA, CHEW TAB) Take 1 tablet by mouth daily 90 tablet 5    Vitamin D, Cholecalciferol, 400 units CAPS Take 1 tablet by mouth 2 times daily 60 capsule 5    naproxen sodium (ALEVE) 220 MG tablet Take 1-2 tablets by mouth daily as needed for Pain (Hip and Back Pain)      telmisartan (MICARDIS) 40 MG tablet telmisartan Systems   Constitutional: Negative. Negative for appetite change, fatigue and fever. HENT: Negative for congestion, dental problem, hearing loss, rhinorrhea and trouble swallowing. Eyes: Negative for pain. Respiratory: Negative for chest tightness, shortness of breath and wheezing. Cardiovascular: Negative for chest pain, palpitations and leg swelling. Gastrointestinal: Negative for abdominal distention, abdominal pain, diarrhea and nausea. Endocrine: Negative for cold intolerance and polydipsia. Genitourinary: Negative for difficulty urinating and frequency. Musculoskeletal: Positive for arthralgias and back pain. Negative for gait problem. Skin: Negative for rash. Allergic/Immunologic: Negative for environmental allergies. Neurological: Negative for dizziness, seizures and syncope. Hematological: Does not bruise/bleed easily. Psychiatric/Behavioral: Negative for behavioral problems and suicidal ideas. OBJECTIVE:     /78 (Site: Right Lower Arm, Position: Sitting, Cuff Size: Medium Adult)   Pulse 72   Resp 16   Ht 5' (1.524 m)   Wt 254 lb 12.8 oz (115.6 kg)   BMI 49.76 kg/m²   Wt Readings from Last 3 Encounters:   07/18/19 254 lb 12.8 oz (115.6 kg)   06/27/19 254 lb 1.6 oz (115.3 kg)   06/13/19 255 lb 4.8 oz (115.8 kg)       Physical Exam   Constitutional: She is oriented to person, place, and time. She appears well-developed and well-nourished. Obese   HENT:   Head: Normocephalic and atraumatic. Right Ear: Hearing and ear canal normal.   Left Ear: Hearing and ear canal normal.   Nose: Nose normal.   Mouth/Throat: Uvula is midline and oropharynx is clear and moist.   Eyes: Pupils are equal, round, and reactive to light. Conjunctivae are normal.   Neck: Normal range of motion. Cardiovascular: Normal rate, regular rhythm and normal heart sounds. Pulmonary/Chest: Effort normal and breath sounds normal. She has no decreased breath sounds. She has no wheezes.  She has

## 2019-08-15 ENCOUNTER — OFFICE VISIT (OUTPATIENT)
Dept: BARIATRICS/WEIGHT MGMT | Age: 59
End: 2019-08-15
Payer: COMMERCIAL

## 2019-08-15 VITALS
DIASTOLIC BLOOD PRESSURE: 64 MMHG | HEART RATE: 69 BPM | SYSTOLIC BLOOD PRESSURE: 118 MMHG | BODY MASS INDEX: 50 KG/M2 | HEIGHT: 60 IN | WEIGHT: 254.7 LBS | RESPIRATION RATE: 18 BRPM

## 2019-08-15 DIAGNOSIS — E66.01 MORBID OBESITY WITH BMI OF 45.0-49.9, ADULT (HCC): ICD-10-CM

## 2019-08-15 DIAGNOSIS — I10 ESSENTIAL HYPERTENSION: Primary | ICD-10-CM

## 2019-08-15 PROCEDURE — 99213 OFFICE O/P EST LOW 20 MIN: CPT | Performed by: NURSE PRACTITIONER

## 2019-08-15 ASSESSMENT — ENCOUNTER SYMPTOMS
TROUBLE SWALLOWING: 0
NAUSEA: 0
EYE PAIN: 0
CHEST TIGHTNESS: 0
BACK PAIN: 1
SHORTNESS OF BREATH: 0
RHINORRHEA: 0
ABDOMINAL DISTENTION: 0
ABDOMINAL PAIN: 0
WHEEZING: 0
DIARRHEA: 0

## 2019-08-15 NOTE — PROGRESS NOTES
Zion Katz  1960   61 y.o. SUBJECT EDSON:    Chief Complaint   Patient presents with    Follow-up     Weight loss     Past Medical History:   Diagnosis Date    Diabetes ([facility] [address]     DM (diabetes mellitus) ([facility] [address]     Dyslipidemia     GERD (gastroesophageal reflux disease)     H/O cardiovascular stress test 06/08/2017    Normal EF 79 % with normal ventricular contractility. ECG portion of stress test is negative for ischemia by diagnostic criteria    H/O Doppler ultrasound (Venous Duplex, lower extremities) 06/07/2018    No evidence of DVT or SVT in the bilateral common femoral vein, femoral vein, popliteal vein, greater saphenous vein or small saphenous vein. Significant reflux noted in the Left CVF 1.1sec, GSV at [facility] .0.6sec.    H/O echocardiogram 06/08/2017; 6/7/2018    Difficult study Moderate concentric LVH with normal systolic function. EF is 50 to 55 %, Impaired relaxation compatible with diastolic dysfunction. ( reversed E/A ratio) The left atrium is Mildly dilated.     Hypertension     IBS (irritable bowel syndrome)     Sphincter of Oddi spasm      Past Surgical History:   Procedure Laterality Date    CHOLECYSTECTOMY      COLONOSCOPY  12/2017    x4 total    CYST REMOVAL      ENDOSCOPY, COLON, DIAGNOSTIC      x2    ENDOSCOPY, COLON, DIAGNOSTIC  10/09/2018    Normal exam    TONSILLECTOMY      UPPER GASTROINTESTINAL ENDOSCOPY N/A 10/9/2018    EGD DIAGNOSTIC ONLY performed by Cheyanne Garduno MD at [facility] 12     Current Outpatient Medications   Medication Sig Dispense Refill    Multiple Vitamin (MVI, CELEBRATE, CHEWABLE TABLET) Take 1 tablet by mouth daily      Evening Primrose Oil 1000 MG CAPS Take 1 capsule by mouth 3 times daily      Vitamin D, Cholecalciferol, 400 units CAPS Take 1 tablet by mouth 2 times daily 60 capsule 5    naproxen sodium (ALEVE) 220 MG tablet Take 1-2 tablets by mouth daily as needed for Pain (Hip and Back Pain)      telmisartan (MICARDIS) 40 MG tablet and wheezing. Cardiovascular: Negative for chest pain, palpitations and leg swelling. Gastrointestinal: Negative for abdominal distention, abdominal pain, diarrhea and nausea. Endocrine: Negative for cold intolerance and polydipsia. Genitourinary: Negative for difficulty urinating and frequency. Musculoskeletal: Positive for arthralgias and back pain. Negative for gait problem. Skin: Negative for rash. Allergic/Immunologic: Negative for environmental allergies. Neurological: Negative for dizziness, seizures and syncope. Hematological: Does not bruise/bleed easily. Psychiatric/Behavioral: Negative for behavioral problems and suicidal ideas. OBJECTIVE:     /64 (Site: Right Upper Arm, Position: Sitting, Cuff Size: Large Adult)   Pulse 69   Resp 18   Ht 5' (1.524 m)   Wt 254 lb 11.2 oz (115.5 kg)   BMI 49.74 kg/m²   Wt Readings from Last 3 Encounters:   08/15/19 254 lb 11.2 oz (115.5 kg)   07/18/19 254 lb 12.8 oz (115.6 kg)   06/27/19 254 lb 1.6 oz (115.3 kg)       Physical Exam   Constitutional: She is oriented to person, place, and time. She appears well-developed and well-nourished. Obese   HENT:   Head: Normocephalic and atraumatic. Right Ear: Hearing and ear canal normal.   Left Ear: Hearing and ear canal normal.   Nose: Nose normal.   Mouth/Throat: Uvula is midline and oropharynx is clear and moist.   Eyes: Pupils are equal, round, and reactive to light. Conjunctivae are normal.   Neck: Normal range of motion. Cardiovascular: Normal rate, regular rhythm and normal heart sounds. Pulmonary/Chest: Effort normal and breath sounds normal. She has no decreased breath sounds. She has no wheezes. She has no rhonchi. She has no rales. Abdominal: Soft. Bowel sounds are normal. There is no tenderness. Musculoskeletal: Normal range of motion. She exhibits no tenderness. In all 4 extremities. Neurological: She is alert and oriented to person, place, and time.  She has normal strength. She exhibits normal muscle tone. GCS eye subscore is 4. GCS verbal subscore is 5. GCS motor subscore is 6. Skin: Skin is warm and dry. No rash noted. Psychiatric: She has a normal mood and affect. Her behavior is normal. Judgment normal.   Nursing note and vitals reviewed. ASSESSMENT/ PLAN:    1. Essential hypertension  - Stable today. Continue on micardis 40 mg.   - Continue management per PCP. 2. Morbid obesity with BMI of 45.0-49.9, adult ([facility] [address]  - Patient struggling with weight loss. - Has been on celebrate MVI daily, no side effects but no weight loss noted. - Discussed weight loss medications and limited options.   - Educated on surgical program, candidacy and benefits to get to IBW etc.   - Patient would like to attend new patient seminar.   - RTC 1 month. No orders of the defined types were placed in this encounter. Return in about 1 month (around 9/15/2019).     Kristen Coronel, CNP

## 2019-09-12 ENCOUNTER — OFFICE VISIT (OUTPATIENT)
Dept: BARIATRICS/WEIGHT MGMT | Age: 59
End: 2019-09-12
Payer: COMMERCIAL

## 2019-09-12 VITALS
BODY MASS INDEX: 49.75 KG/M2 | HEIGHT: 60 IN | DIASTOLIC BLOOD PRESSURE: 70 MMHG | SYSTOLIC BLOOD PRESSURE: 114 MMHG | HEART RATE: 74 BPM | RESPIRATION RATE: 16 BRPM | WEIGHT: 253.4 LBS

## 2019-09-12 DIAGNOSIS — E66.01 MORBID OBESITY WITH BMI OF 45.0-49.9, ADULT (HCC): ICD-10-CM

## 2019-09-12 DIAGNOSIS — I10 ESSENTIAL HYPERTENSION: Primary | ICD-10-CM

## 2019-09-12 PROCEDURE — 99213 OFFICE O/P EST LOW 20 MIN: CPT | Performed by: NURSE PRACTITIONER

## 2019-09-12 ASSESSMENT — ENCOUNTER SYMPTOMS
WHEEZING: 0
DIARRHEA: 0
TROUBLE SWALLOWING: 0
ABDOMINAL PAIN: 0
SHORTNESS OF BREATH: 0
EYE PAIN: 0
CHEST TIGHTNESS: 0
ABDOMINAL DISTENTION: 0
RHINORRHEA: 0
NAUSEA: 0

## 2019-09-12 NOTE — PROGRESS NOTES
exhibits no tenderness. In all 4 extremities. Neurological: She is alert and oriented to person, place, and time. She has normal strength. She exhibits normal muscle tone. GCS eye subscore is 4. GCS verbal subscore is 5. GCS motor subscore is 6. Skin: Skin is warm and dry. No rash noted. Psychiatric: She has a normal mood and affect. Her behavior is normal. Judgment normal.   Nursing note and vitals reviewed. ASSESSMENT/ PLAN:    1. Morbid obesity with BMI of 45.0-49.9, adult ([facility] [address]  - Patient non-compliant with Diet. No medications to offer given current medications and hx.   - No weight loss medications to offer and stopped celebrate multi-well. - Dicussed non-surgical program and following diet recommendations etc for success. - PRN with NP.   - RTC with RD for non-surgical program.     2. Essential hypertension  - HTN stable, continue micardis. - PCP for managing. No orders of the defined types were placed in this encounter. No follow-ups on file.     Andres Welch, CNP

## 2019-10-02 ENCOUNTER — OFFICE VISIT (OUTPATIENT)
Dept: CARDIOLOGY CLINIC | Age: 59
End: 2019-10-02
Payer: COMMERCIAL

## 2019-10-02 VITALS
HEIGHT: 60 IN | SYSTOLIC BLOOD PRESSURE: 116 MMHG | RESPIRATION RATE: 16 BRPM | WEIGHT: 254 LBS | HEART RATE: 80 BPM | BODY MASS INDEX: 49.87 KG/M2 | DIASTOLIC BLOOD PRESSURE: 68 MMHG

## 2019-10-02 DIAGNOSIS — E66.9 OBESITY (BMI 35.0-39.9 WITHOUT COMORBIDITY): Primary | ICD-10-CM

## 2019-10-02 PROCEDURE — 99213 OFFICE O/P EST LOW 20 MIN: CPT | Performed by: INTERNAL MEDICINE

## 2019-10-02 RX ORDER — FOLIC ACID/MULTIVIT,IRON,MINER .4-18-35
1 TABLET,CHEWABLE ORAL PRN
COMMUNITY
End: 2020-02-24

## 2019-10-02 RX ORDER — CALCIUM POLYCARBOPHIL 625 MG 625 MG/1
625 TABLET ORAL 2 TIMES DAILY
COMMUNITY
End: 2019-10-02

## 2019-10-02 RX ORDER — PHENTERMINE HYDROCHLORIDE 37.5 MG/1
37.5 TABLET ORAL
Qty: 30 TABLET | Refills: 0 | Status: SHIPPED | OUTPATIENT
Start: 2019-10-02 | End: 2019-11-01

## 2019-11-04 ENCOUNTER — OFFICE VISIT (OUTPATIENT)
Dept: CARDIOLOGY CLINIC | Age: 59
End: 2019-11-04
Payer: COMMERCIAL

## 2019-11-04 VITALS
WEIGHT: 249.6 LBS | HEIGHT: 61 IN | HEART RATE: 88 BPM | SYSTOLIC BLOOD PRESSURE: 130 MMHG | DIASTOLIC BLOOD PRESSURE: 76 MMHG | OXYGEN SATURATION: 98 % | BODY MASS INDEX: 47.13 KG/M2

## 2019-11-04 DIAGNOSIS — E66.9 OBESITY (BMI 35.0-39.9 WITHOUT COMORBIDITY): Primary | ICD-10-CM

## 2019-11-04 PROCEDURE — 99214 OFFICE O/P EST MOD 30 MIN: CPT | Performed by: INTERNAL MEDICINE

## 2019-11-04 RX ORDER — PHENTERMINE HYDROCHLORIDE 37.5 MG/1
37.5 TABLET ORAL
Qty: 30 TABLET | Refills: 0 | Status: SHIPPED | OUTPATIENT
Start: 2019-11-04 | End: 2019-12-04

## 2019-11-04 RX ORDER — PHENTERMINE HYDROCHLORIDE 37.5 MG/1
37.5 TABLET ORAL
COMMUNITY
End: 2019-11-04 | Stop reason: SDUPTHER

## 2019-12-04 ENCOUNTER — OFFICE VISIT (OUTPATIENT)
Dept: CARDIOLOGY CLINIC | Age: 59
End: 2019-12-04
Payer: COMMERCIAL

## 2019-12-04 VITALS
WEIGHT: 241 LBS | BODY MASS INDEX: 47.32 KG/M2 | DIASTOLIC BLOOD PRESSURE: 74 MMHG | SYSTOLIC BLOOD PRESSURE: 128 MMHG | RESPIRATION RATE: 14 BRPM | HEIGHT: 60 IN

## 2019-12-04 DIAGNOSIS — E66.9 OBESITY (BMI 35.0-39.9 WITHOUT COMORBIDITY): Primary | ICD-10-CM

## 2019-12-04 PROCEDURE — 99214 OFFICE O/P EST MOD 30 MIN: CPT | Performed by: INTERNAL MEDICINE

## 2019-12-04 RX ORDER — PHENTERMINE HYDROCHLORIDE 37.5 MG/1
37.5 TABLET ORAL
Qty: 30 TABLET | Refills: 0 | Status: SHIPPED | OUTPATIENT
Start: 2019-12-04 | End: 2020-01-03

## 2020-01-06 ENCOUNTER — HOSPITAL ENCOUNTER (EMERGENCY)
Age: 60
Discharge: HOME | End: 2020-01-06
Attending: EMERGENCY MEDICINE
Payer: COMMERCIAL

## 2020-01-06 VITALS
OXYGEN SATURATION: 93 % | DIASTOLIC BLOOD PRESSURE: 96 MMHG | WEIGHT: 240 LBS | RESPIRATION RATE: 18 BRPM | HEART RATE: 115 BPM | TEMPERATURE: 97.6 F | HEIGHT: 60 IN | SYSTOLIC BLOOD PRESSURE: 158 MMHG | BODY MASS INDEX: 47.12 KG/M2

## 2020-01-06 PROCEDURE — 4500000028 HC INTERMEDIATE PROCEDURE

## 2020-01-06 PROCEDURE — 99282 EMERGENCY DEPT VISIT SF MDM: CPT

## 2020-01-06 RX ORDER — LIDOCAINE HYDROCHLORIDE 10 MG/ML
5 INJECTION, SOLUTION EPIDURAL; INFILTRATION; INTRACAUDAL; PERINEURAL ONCE
Status: DISCONTINUED | OUTPATIENT
Start: 2020-01-06 | End: 2020-01-06 | Stop reason: HOSPADM

## 2020-01-06 RX ORDER — DOCUSATE SODIUM 100 MG/1
100 CAPSULE, LIQUID FILLED ORAL DAILY PRN
Qty: 30 CAPSULE | Refills: 0 | Status: SHIPPED | OUTPATIENT
Start: 2020-01-06 | End: 2020-06-08

## 2020-01-06 RX ORDER — PHENTERMINE HYDROCHLORIDE 37.5 MG/1
37.5 CAPSULE ORAL EVERY MORNING
COMMUNITY
End: 2020-02-24

## 2020-01-06 RX ORDER — ONDANSETRON 4 MG/1
4 TABLET, FILM COATED ORAL 2 TIMES DAILY
COMMUNITY
End: 2020-06-08

## 2020-01-06 ASSESSMENT — PAIN SCALES - GENERAL: PAINLEVEL_OUTOF10: 9

## 2020-01-06 ASSESSMENT — ENCOUNTER SYMPTOMS
CONSTIPATION: 1
RECTAL PAIN: 1

## 2020-01-06 ASSESSMENT — PAIN DESCRIPTION - LOCATION: LOCATION: RECTUM

## 2020-01-06 ASSESSMENT — PAIN DESCRIPTION - DESCRIPTORS: DESCRIPTORS: BURNING;SHARP

## 2020-01-06 NOTE — ED PROVIDER NOTES
ENDOSCOPY, COLON, DIAGNOSTIC  10/09/2018    Normal exam    TONSILLECTOMY      UPPER GASTROINTESTINAL ENDOSCOPY N/A 10/9/2018    EGD DIAGNOSTIC ONLY performed by Carmen Harris MD at [facility] 12     Family History   Problem Relation Age of Onset    Stroke Mother     High Blood Pressure Mother     Cancer Father     Other Father     Diabetes Father     High Blood Pressure Father     Stroke Father      Social History     Socioeconomic History    Marital status:      Spouse name: Not on file    Number of children: 3    Years of education: 12th grade    Highest education level: Not on file   Occupational History    Occupation: none   Social Needs    Financial resource strain: Not on file    Food insecurity:     Worry: Not on file     Inability: Not on file   Umami needs:     Medical: Not on file     Non-medical: Not on file   Tobacco Use    Smoking status: Former Smoker     Types: Cigarettes     Last attempt to quit: 1995     Years since quittin.9    Smokeless tobacco: Never Used   Substance and Sexual Activity    Alcohol use: Yes     Comment: rarely    Drug use: No    Sexual activity: Yes     Partners: Male   Lifestyle    Physical activity:     Days per week: Not on file     Minutes per session: Not on file    Stress: Not on file   Relationships    Social connections:     Talks on phone: Not on file     Gets together: Not on file     Attends Oriental orthodox service: Not on file     Active member of club or organization: Not on file     Attends meetings of clubs or organizations: Not on file     Relationship status: Not on file    Intimate partner violence:     Fear of current or ex partner: Not on file     Emotionally abused: Not on file     Physically abused: Not on file     Forced sexual activity: Not on file   Other Topics Concern    Not on file   Social History Narrative    Not on file     Current Facility-Administered Medications   Medication Dose Route Frequency Vitals [01/06/20 1227]   Enc Vitals Group      BP (!) 158/96      Pulse 115      Resp 18      Temp 97.6 °F (36.4 °C)      Temp Source Oral      SpO2 93 %      Weight 240 lb (108.9 kg)      Height 5' (1.524 m)      Head Circumference       Peak Flow       Pain Score       Pain Loc       Pain Edu? Excl. in [address]? Physical Exam  Vitals signs and nursing note reviewed. Constitutional:       General: She is in acute distress. Appearance: She is well-developed. She is obese. HENT:      Head: Normocephalic and atraumatic. Eyes:      Pupils: Pupils are equal, round, and reactive to light. Neck:      Musculoskeletal: Normal range of motion and neck supple. Genitourinary:     Rectum: Tenderness and external hemorrhoid present. Musculoskeletal: Normal range of motion. Skin:     General: Skin is warm and dry. Neurological:      Mental Status: She is alert and oriented to person, place, and time. I have reviewed and interpreted all of the currently available lab results from this visit (ifapplicable):  No results found for this visit on 01/06/20. Radiographs (if obtained):  [] The following radiograph wasinterpreted by myself in the absence of a radiologist:   [] Radiologist's Report Reviewed:  No orders to display         EKG (if obtained): (All EKG's are interpreted by myself in the absence of a cardiologist)    Chart review shows recent radiographs:  No results found. MDM:      PROCEDURE:  INCISION & DRAINAGE: External Thrombosed Hemorroid  Jonathan Quintanilla or their surrogate had an opportunity to ask questions, and the risks, benefits, and alternatives were discussed. The abscess was prepped and draped to maintain a sterile field. A local anesthetic was used to completely anesthetize the abscess. An incision was made to keep the abscess open so it will continue to drain. It was copiously irrigated with its loculations broken down.  There were no complications during the procedure. Patient tolerated the hemorrhoid I&D with any complications large clot was delivered. She will placed on Colace 100 mg twice daily since backslash strength Tylenol 500 every 6 hours follow-up in 48 hours with her PCP         Clinical Impression:  1. External thrombosed hemorrhoids      Disposition referral (if applicable):  MD Hemal[address]  Ama Dawson  [phone]    Schedule an appointment as soon as possible for a visit in 2 days  If symptoms worsen    Disposition medications (if applicable):  New Prescriptions    DOCUSATE SODIUM (COLACE) 100 MG CAPSULE    Take 1 capsule by mouth daily as needed for Constipation           Melvenia Reba Galicia DO, FACEP      Comment: Please note this report has been produced using speech recognition software and maycontain errors related to that system including errors in grammar, punctuation, and spelling, as well as words and phrases that may be inappropriate. If there are any questions or concerns please feel free to contact thedictating provider for clarification.         Odilon Frost DO  01/06/20 4024

## 2021-07-27 ENCOUNTER — TELEPHONE (OUTPATIENT)
Dept: BARIATRICS/WEIGHT MGMT | Age: 61
End: 2021-07-27

## 2023-02-08 ENCOUNTER — INITIAL CONSULT (OUTPATIENT)
Dept: CARDIOLOGY CLINIC | Age: 63
End: 2023-02-08

## 2023-02-08 ENCOUNTER — TELEPHONE (OUTPATIENT)
Dept: CARDIOLOGY CLINIC | Age: 63
End: 2023-02-08

## 2023-02-08 VITALS
WEIGHT: 293 LBS | HEART RATE: 83 BPM | BODY MASS INDEX: 57.52 KG/M2 | DIASTOLIC BLOOD PRESSURE: 92 MMHG | HEIGHT: 60 IN | SYSTOLIC BLOOD PRESSURE: 137 MMHG

## 2023-02-08 DIAGNOSIS — R06.02 SOB (SHORTNESS OF BREATH): ICD-10-CM

## 2023-02-08 DIAGNOSIS — I10 ESSENTIAL HYPERTENSION: Primary | ICD-10-CM

## 2023-02-08 RX ORDER — RANOLAZINE 500 MG/1
500 TABLET, EXTENDED RELEASE ORAL DAILY
COMMUNITY

## 2023-02-08 RX ORDER — FUROSEMIDE 20 MG/1
20 TABLET ORAL DAILY
COMMUNITY

## 2023-02-08 RX ORDER — METOPROLOL SUCCINATE 25 MG/1
25 TABLET, EXTENDED RELEASE ORAL DAILY
COMMUNITY

## 2023-02-08 NOTE — PROGRESS NOTES
CARDIOLOGY CONSULT NOTE   Reason for consultation:  shortness of breath    Referring physician:   Lanny So MD    Primary care physician: Lanny So MD      Dear  Lanny So MD    Thanks for the consult. History of present illness: Aretha Estrella is a 58 y. o.year old who  presents with for shortness of breath which is moderate to severe, for few weeks, intermittent, self limiting, not associated with cough or fever, gets worse with activity and better with rest,she was was SELF [facility] for cardiology service and was supposed to get stress test and echo. She is morbidly obese  Chief Complaint   Patient presents with    Hyperlipidemia    Hypertension    Thyroid Problem     Pt complains of SOB,swelling in legs and feet, dizziness,   No palpitations     Blood pressure, cholesterol, blood glucose and weight are well controlled. Past medical history:    has a past medical history of Anxiety, Depression, Diabetes ([address] DM (diabetes mellitus) ([facility] [address] Dyslipidemia, GERD (gastroesophageal reflux disease), H/O cardiovascular stress test, H/O Doppler ultrasound (Venous Duplex, lower extremities), H/O echocardiogram, Hyperlipidemia, Hypertension, IBS (irritable bowel syndrome), Sphincter of Oddi spasm, and Thyroid disease. Past surgical history:   has a past surgical history that includes cyst removal; Tonsillectomy; Cholecystectomy; Colonoscopy (12/2017); Endoscopy, colon, diagnostic; Endoscopy, colon, diagnostic (10/09/2018); Upper gastrointestinal endoscopy (N/A, 10/9/2018); and Hemorrhoid surgery (2020). Social History:   reports that she quit smoking about 28 years ago. Her smoking use included cigarettes. She has never used smokeless tobacco. She reports that she does not currently use alcohol. She reports that she does not use drugs.   Family history:   no family history of CAD, STROKE of DM    Allergies   Allergen Reactions    Amoxicillin Nausea And Vomiting    Clavulanic Acid Nausea And Vomiting Metformin And Related        No current facility-administered medications for this visit.     Current Outpatient Medications   Medication Sig Dispense Refill    furosemide (LASIX) 20 MG tablet Take 20 mg by mouth daily      metoprolol succinate (TOPROL XL) 25 MG extended release tablet Take 25 mg by mouth daily      ranolazine (RANEXA) 500 MG extended release tablet Take 500 mg by mouth daily      Empagliflozin (JARDIANCE PO) Take by mouth      Multiple Vitamins-Minerals (THERAPEUTIC MULTIVITAMIN-MINERALS) tablet Take 1 tablet by mouth daily      vitamin E 400 UNIT capsule Take 1 capsule by mouth 2 times daily 60 capsule 5    vitamin D3 (CHOLECALCIFEROL) 25 MCG (1000 UT) TABS tablet Take 1,000 Units by mouth daily       SITagliptin (JANUVIA) 100 MG tablet SITagliptin Sitagliptin (Januvia) 100 mg tablet Active 100 MG PO DAILY 90 90 September 23rd, 2020 1:19pm 09-  [facility] ([id number])      glipiZIDE (GLUCOTROL XL) 2.5 MG extended release tablet Take 10 mg by mouth in the morning and at bedtime       famotidine (PEPCID) 20 MG tablet Take 1 tablet by mouth nightly (Patient taking differently: Take 40 mg by mouth daily 1/2 tab daily) 30 tablet 6    Coenzyme Q10 (CO Q 10 PO) Take by mouth daily      EVENING PRIMROSE OIL PO Take 1,000 mg by mouth daily Indications: 3 daily      telmisartan (MICARDIS) 40 MG tablet Take 80 mg by mouth daily       omeprazole (PRILOSEC) 40 MG delayed release capsule Take by mouth daily       levothyroxine (SYNTHROID) 50 MCG tablet Take 1 tablet by mouth Daily 30 tablet 1    dicyclomine (BENTYL) 20 MG tablet Take 1 tablet by mouth 3 times daily as needed (cramp) (Patient taking differently: Take 20 mg by mouth 3 times daily) 90 tablet 3    Glucose Blood (BLOOD GLUCOSE TEST STRIPS) STRP To check blood sugar twice daily with current Glucometer:   DX: diabetes type .00 60 strip 11    Blood Glucose Monitoring Suppl (RELION ULTIMA GLUCOSE SYSTEM) W/DEVICE KIT Use to check BS twice daily 1 kit 0    RELION ULTRA THIN LANCETS 30G MISC Check BS twice daily- before breakfast and two  Hours after supper 100 each 3    escitalopram (LEXAPRO) 10 MG tablet Take 10 mg by mouth daily (Patient not taking: Reported on 2/8/2023)      methylcellulose 500 MG TABS Take 1 tablet by mouth daily      propranolol (INDERAL LA) 80 MG extended release capsule Take 80 mg by mouth daily      atorvastatin (LIPITOR) 40 MG tablet Take 1 tablet by mouth daily (Patient not taking: Reported on 2/8/2023) 30 tablet 3     No current facility-administered medications for this visit. Review of Systems:   Constitutional: No Fever or Weight Loss   Eyes: No Decreased Vision  ENT: No Headaches, Hearing Loss or Vertigo  Cardiovascular: No chest pain, dyspnea on exertion, palpitations or loss of consciousness  Respiratory: No cough or wheezing    Gastrointestinal: No abdominal pain, appetite loss, blood in stools, constipation, diarrhea or heartburn  Genitourinary: No dysuria, trouble voiding, or hematuria  Musculoskeletal:  No gait disturbance, weakness or joint complaints  Integumentary: No rash or pruritis  Neurological: No TIA or stroke symptoms  Psychiatric: No anxiety or depression  Endocrine: No malaise, fatigue or temperature intolerance  Hematologic/Lymphatic: No bleeding problems, blood clots or swollen lymph nodes  Allergic/Immunologic: No nasal congestion or hives  All systems negative except as marked. Physical Examination:    Vitals:    02/08/23 1037   BP: (!) 137/92   Pulse: 83    Rr 14  afebrile  Wt Readings from Last 3 Encounters:   02/08/23 297 lb 3.2 oz (134.8 kg)   10/04/22 (!) 309 lb (140.2 kg)   06/28/22 (!) 304 lb (137.9 kg)     Body mass index is 58.04 kg/m². General Appearance:  No distress, conversant    Constitutional:  Well developed, Well nourished, No acute distress, Non-toxic appearance.    HENT:  Normocephalic, Atraumatic, Bilateral external ears normal, Oropharynx moist, No oral exudates, Nose normal. Neck- Normal range of motion, No tenderness, Supple, No stridor,no apical-carotid delay, no carotid bruit  Eyes:  PERRL, EOMI, Conjunctiva normal, No discharge. Respiratory:  Normal breath sounds, No respiratory distress, No wheezing, No chest tenderness. ,no use of accessory muscles, diaphragm movement is normal  Cardiovascular: (PMI) apex non displaced,no lifts no thrills, no s3,no s4, Normal heart rate, Normal rhythm, No murmurs, No rubs, No gallops. Carotid arteries pulse and amplitude are normal no bruit, no abdominal bruit noted ( normal abdominal aorta ausculation), femoral arteries pulse and amplitude are normal no bruit, pedal pulses are normal  GI:  Bowel sounds normal, Soft, No tenderness, No masses, No pulsatile masses, no hepatosplenomegally, no bruits  : External genitalia appear normal, No masses or lesions. No discharge. No CVA tenderness. Musculoskeletal:  Intact distal pulses,+ edema, No tenderness, No cyanosis, No clubbing. Good range of motion in all major joints. No tenderness to palpation or major deformities noted. Back- No tenderness. Integument:  Warm, Dry, No erythema, No rash. Skin: no rash, no ulcers  Lymphatic:  No lymphadenopathy noted. Neurologic:  Alert & oriented x 3, Normal motor function, Normal sensory function, No focal deficits noted. Psychiatric:  Affect normal, Judgment normal, Mood normal.   Lab Review   No results for input(s): WBC, HGB, HCT, PLT in the last 72 hours. No results for input(s): NA, K, CL, CO2, PHOS, BUN, CREATININE, CA in the last 72 hours. No results for input(s): AST, ALT, ALB, BILIDIR, BILITOT, ALKPHOS in the last 72 hours. No results for input(s): TROPONINI in the last 72 hours. No results found for: BNP  No results found for: INR, PROTIME      EKG:nsr    Chest Xray:    ECHO:pending  Labs, echo, meds reviewed  Assessment: 58 y. o.year old with PMH of  has a past medical history of Anxiety, Depression, Diabetes ([address] DM (diabetes mellitus) ([facility] [address] Dyslipidemia, GERD (gastroesophageal reflux disease), H/O cardiovascular stress test, H/O Doppler ultrasound (Venous Duplex, lower extremities), H/O echocardiogram, Hyperlipidemia, Hypertension, IBS (irritable bowel syndrome), Sphincter of Oddi spasm, and Thyroid disease. Recommendations:    Shortness of breath\" she cannot do treadmill due to back pain, will get lexiscan stress test and echo, ok to use lasix for now with potassium  Dyslipidemia: continue statins  HTN: stable, continue lopressor, lisinopril, hctz medicatons  DM: stable, continue jardiace insulin  Leg swelling: from severe reflux in CFV, [employer] website visit with patient, recommend to use compression wraps and automatic leg compression sleeve  H.o COVID in december  Health maintenance: exerise and diet  All labs, medications and tests reviewed, continue all other medications of all above medical condition listed as is.          Pollo Byrd MD, 2/8/2023 10:58 AM

## 2023-02-22 ENCOUNTER — PROCEDURE VISIT (OUTPATIENT)
Dept: CARDIOLOGY CLINIC | Age: 63
End: 2023-02-22

## 2023-02-22 DIAGNOSIS — R06.02 SOB (SHORTNESS OF BREATH): Primary | ICD-10-CM

## 2023-02-22 DIAGNOSIS — R06.02 SOB (SHORTNESS OF BREATH): ICD-10-CM

## 2023-02-22 LAB
LV EF: 58 %
LV EF: 76 %
LVEF MODALITY: NORMAL
LVEF MODALITY: NORMAL

## 2023-02-27 ENCOUNTER — TELEPHONE (OUTPATIENT)
Dept: CARDIOLOGY CLINIC | Age: 63
End: 2023-02-27

## 2023-02-27 NOTE — TELEPHONE ENCOUNTER
NM Stress Test and Echo done on 2/22/2023:    Stress Test:     Supervising physician Dr. Giovani Claire . Normal LV function. normal EDV    There is normal isotope uptake following exercise and at rest. There is no    evidence of exercise induced ischemia. This is a normal study. Echo:    Technically difficult examination due to poor echo windows and patients c/o   of severe pain in abdomen when applying pressure. Left ventricular function and size is normal, EF is estimated at 55-60%. Mild left ventricular hypertrophy. Grade I diastolic dysfunction. Mildly dilated left atrium. No significant valvular abnormalities. Mild tricuspid regurgitation. Mild Pulmonary hypertension with RVSP of 42mmHg. No evidence of pericardial effusion. Patient was given results for NM Stress Test and Echo via voicemail , reminding patient of next follow up with Dr. Amalia Cedeno in [facility].

## 2023-03-29 ENCOUNTER — OFFICE VISIT (OUTPATIENT)
Dept: CARDIOLOGY CLINIC | Age: 63
End: 2023-03-29
Payer: MEDICARE

## 2023-03-29 VITALS
HEIGHT: 60 IN | WEIGHT: 293 LBS | SYSTOLIC BLOOD PRESSURE: 132 MMHG | HEART RATE: 66 BPM | BODY MASS INDEX: 57.52 KG/M2 | DIASTOLIC BLOOD PRESSURE: 80 MMHG

## 2023-03-29 DIAGNOSIS — R06.02 SOB (SHORTNESS OF BREATH): Primary | ICD-10-CM

## 2023-03-29 PROCEDURE — 3017F COLORECTAL CA SCREEN DOC REV: CPT | Performed by: INTERNAL MEDICINE

## 2023-03-29 PROCEDURE — 99214 OFFICE O/P EST MOD 30 MIN: CPT | Performed by: INTERNAL MEDICINE

## 2023-03-29 PROCEDURE — G8484 FLU IMMUNIZE NO ADMIN: HCPCS | Performed by: INTERNAL MEDICINE

## 2023-03-29 PROCEDURE — 3079F DIAST BP 80-89 MM HG: CPT | Performed by: INTERNAL MEDICINE

## 2023-03-29 PROCEDURE — G8427 DOCREV CUR MEDS BY ELIG CLIN: HCPCS | Performed by: INTERNAL MEDICINE

## 2023-03-29 PROCEDURE — G8417 CALC BMI ABV UP PARAM F/U: HCPCS | Performed by: INTERNAL MEDICINE

## 2023-03-29 PROCEDURE — 3075F SYST BP GE 130 - 139MM HG: CPT | Performed by: INTERNAL MEDICINE

## 2023-03-29 PROCEDURE — 1036F TOBACCO NON-USER: CPT | Performed by: INTERNAL MEDICINE

## 2023-03-29 RX ORDER — TELMISARTAN AND HYDROCHLORTHIAZIDE 80; 25 MG/1; MG/1
1 TABLET ORAL DAILY
COMMUNITY

## 2023-03-29 RX ORDER — ROSUVASTATIN CALCIUM 5 MG/1
5 TABLET, COATED ORAL DAILY
Qty: 90 TABLET | Refills: 1 | Status: SHIPPED | OUTPATIENT
Start: 2023-03-29

## 2023-03-29 NOTE — PROGRESS NOTES
Yen Esposito MD        OFFICE  FOLLOWUP NOTE    Chief complaints: patient is here for management of obesity, DM, HTN, h/o covid 19, DYSLPIDEMIA, copd    Subjective: patient feels better, no chest pain, no shortness of breath, no dizziness, no palpitations    HPI Will Tristan is a 58 y. o.year old who  has a past medical history of Anxiety, Depression, Diabetes ([address] DM (diabetes mellitus) ([facility] [address] Dyslipidemia, GERD (gastroesophageal reflux disease), H/O cardiovascular stress test, H/O Doppler ultrasound (Venous Duplex, lower extremities), H/O echocardiogram, History of nuclear stress test, Hx of echocardiogram, Hyperlipidemia, Hypertension, IBS (irritable bowel syndrome), Sphincter of Oddi spasm, and Thyroid disease.  and presents for management of obesity, DM, HTN, h/o covid 19, DYSLPIDEMIAwhich are well controlled      Current Outpatient Medications   Medication Sig Dispense Refill    telmisartan-hydroCHLOROthiazide (MICARDIS HCT) 80-25 MG per tablet Take 1 tablet by mouth daily      Potassium 99 MG TABS Take by mouth      famotidine (PEPCID) 20 MG tablet Take 1 tablet by mouth nightly 90 tablet 2    furosemide (LASIX) 20 MG tablet Take 20 mg by mouth daily      metoprolol succinate (TOPROL XL) 25 MG extended release tablet Take 25 mg by mouth daily      ranolazine (RANEXA) 500 MG extended release tablet Take 500 mg by mouth daily      Empagliflozin (JARDIANCE PO) Take by mouth      Multiple Vitamins-Minerals (THERAPEUTIC MULTIVITAMIN-MINERALS) tablet Take 1 tablet by mouth daily      vitamin E 400 UNIT capsule Take 1 capsule by mouth 2 times daily 60 capsule 5    vitamin D3 (CHOLECALCIFEROL) 25 MCG (1000 UT) TABS tablet Take 1,000 Units by mouth daily       glipiZIDE (GLUCOTROL XL) 2.5 MG extended release tablet Take 10 mg by mouth in the morning and at bedtime       EVENING PRIMROSE OIL PO Take 1,000 mg by mouth daily Indications: 3 daily      omeprazole (PRILOSEC) 40 MG delayed release capsule

## 2023-07-11 ENCOUNTER — TELEPHONE (OUTPATIENT)
Dept: CARDIOLOGY CLINIC | Age: 63
End: 2023-07-11

## 2024-01-26 ENCOUNTER — APPOINTMENT (OUTPATIENT)
Dept: CT IMAGING | Age: 64
End: 2024-01-26
Payer: COMMERCIAL

## 2024-01-26 ENCOUNTER — APPOINTMENT (OUTPATIENT)
Dept: GENERAL RADIOLOGY | Age: 64
End: 2024-01-26
Payer: COMMERCIAL

## 2024-01-26 ENCOUNTER — HOSPITAL ENCOUNTER (EMERGENCY)
Age: 64
Discharge: HOME | End: 2024-01-26
Attending: STUDENT IN AN ORGANIZED HEALTH CARE EDUCATION/TRAINING PROGRAM
Payer: COMMERCIAL

## 2024-01-26 VITALS
DIASTOLIC BLOOD PRESSURE: 72 MMHG | HEART RATE: 87 BPM | RESPIRATION RATE: 18 BRPM | OXYGEN SATURATION: 95 % | SYSTOLIC BLOOD PRESSURE: 139 MMHG | TEMPERATURE: 98.7 F

## 2024-01-26 DIAGNOSIS — M25.561 ACUTE PAIN OF RIGHT KNEE: ICD-10-CM
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
## 2024-01-26 DIAGNOSIS — M25.511 ACUTE PAIN OF RIGHT SHOULDER: ICD-10-CM

## 2024-01-26 DIAGNOSIS — M25.551 RIGHT HIP PAIN: ICD-10-CM

## 2024-01-26 DIAGNOSIS — S09.90XA CLOSED HEAD INJURY, INITIAL ENCOUNTER: Primary | ICD-10-CM

## 2024-01-26 PROCEDURE — 72125 CT NECK SPINE W/O DYE: CPT

## 2024-01-26 PROCEDURE — 6360000002 HC RX W HCPCS: Performed by: STUDENT IN AN ORGANIZED HEALTH CARE EDUCATION/TRAINING PROGRAM

## 2024-01-26 PROCEDURE — 73562 X-RAY EXAM OF KNEE 3: CPT

## 2024-01-26 PROCEDURE — 73660 X-RAY EXAM OF TOE(S): CPT

## 2024-01-26 PROCEDURE — 99284 EMERGENCY DEPT VISIT MOD MDM: CPT

## 2024-01-26 PROCEDURE — 73030 X-RAY EXAM OF SHOULDER: CPT

## 2024-01-26 PROCEDURE — 96372 THER/PROPH/DIAG INJ SC/IM: CPT

## 2024-01-26 PROCEDURE — 70450 CT HEAD/BRAIN W/O DYE: CPT

## 2024-01-26 PROCEDURE — 73502 X-RAY EXAM HIP UNI 2-3 VIEWS: CPT

## 2024-01-26 RX ORDER — KETOROLAC TROMETHAMINE 15 MG/ML
15 INJECTION, SOLUTION INTRAMUSCULAR; INTRAVENOUS ONCE
Status: COMPLETED | OUTPATIENT
Start: 2024-01-26 | End: 2024-01-26

## 2024-01-26 RX ADMIN — KETOROLAC TROMETHAMINE 15 MG: 15 INJECTION, SOLUTION INTRAMUSCULAR; INTRAVENOUS at 04:50

## 2024-01-26 ASSESSMENT — ENCOUNTER SYMPTOMS
ABDOMINAL PAIN: 0
SHORTNESS OF BREATH: 0

## 2024-01-26 ASSESSMENT — PAIN SCALES - GENERAL
PAINLEVEL_OUTOF10: 4
PAINLEVEL_OUTOF10: 4
PAINLEVEL_OUTOF10: 8
PAINLEVEL_OUTOF10: 8

## 2024-01-26 ASSESSMENT — PAIN DESCRIPTION - DESCRIPTORS
DESCRIPTORS: THROBBING
DESCRIPTORS: THROBBING

## 2024-01-26 ASSESSMENT — PAIN DESCRIPTION - ORIENTATION
ORIENTATION: RIGHT

## 2024-01-26 ASSESSMENT — PAIN - FUNCTIONAL ASSESSMENT
PAIN_FUNCTIONAL_ASSESSMENT: 0-10
PAIN_FUNCTIONAL_ASSESSMENT: PREVENTS OR INTERFERES SOME ACTIVE ACTIVITIES AND ADLS

## 2024-01-26 ASSESSMENT — PAIN DESCRIPTION - PAIN TYPE: TYPE: ACUTE PAIN;CHRONIC PAIN

## 2024-01-26 NOTE — ED NOTES
The right neck was prepped. The site was prepped with ChloraPrep. The patient was draped. The patient was positioned supine. Transferred to w/c per request. States much more comfortable. Unable to lie down imaging delayed to allow for pain medication to take effect. Dr. Kang aware.

## 2024-01-26 NOTE — DISCHARGE INSTR - COC
Continuity of Care Form    Patient Name: Jonathan Ramos   :  1960  MRN:  [id number]    Admit date:  2024  Discharge date:  ***    Code Status Order: No Order   Advance Directives:     Admitting Physician:  No admitting provider for patient encounter.  PCP: Giles Ball MD    Discharging Nurse: ***  Discharging Hospital Unit/Room#:   Discharging Unit Phone Number: ***    Emergency Contact:   Extended Emergency Contact Information  Primary Emergency Contact: Jose Ramos  Address: [address] [place] [address]  Home Phone: [phone]  Work Phone: [phone]  Mobile Phone: [phone]  Relation: Spouse    Past Surgical History:  Past Surgical History:   Procedure Laterality Date    CHOLECYSTECTOMY      COLONOSCOPY  12/2017    x4 total    CYST REMOVAL      ENDOSCOPY, COLON, DIAGNOSTIC      x2    ENDOSCOPY, COLON, DIAGNOSTIC  10/09/2018    Normal exam    HEMORRHOID SURGERY  2020    TONSILLECTOMY      UPPER GASTROINTESTINAL ENDOSCOPY N/A 10/9/2018    EGD DIAGNOSTIC ONLY performed by Quinton Dick MD at [facility] OR       Immunization History:   Immunization History   Administered Date(s) Administered    COVID-19, MODERNA BLUE border, Primary or Immunocompromised, (age 12y+), IM, 100 mcg/0.5mL 12/10/2021       Active Problems:  Patient Active Problem List   Diagnosis Code    Acid reflux K21.9    Morbid obesity (HCC) E66.01    Back muscle spasm M62.830    Essential hypertension I10    Acquired hypothyroidism E03.9    Closed fracture of left ankle with routine healing S82.892D    Family history of colon cancer Z80.0    Constipation K59.00    CCC (chronic calculous cholecystitis) K80.10    Chronic bilateral low back pain with left-sided sciatica M54.42, G89.29    Medically noncompliant Z91.199    Diabetes mellitus type 2 in obese (HCC) E11.69, E66.9    Mixed hyperlipidemia E78.2       Isolation/Infection:   Isolation            No Isolation

## 2024-01-26 NOTE — ED PROVIDER NOTES
Emergency Department Encounter    Patient: Jonathan Ramos  MRN: [id number]  : 1960  Date of Evaluation: 2024  ED Provider:  Traci Kang MD    Triage Chief Complaint:   Fall (Right shoulder pain, right knee pain right toe pain)    Nikolai:  Jonathan Ramos is a 63 y.o. female that presents after fall.  About an hour prior to arrival patient states she was up and leaned forward to try to  the cat.  She states she lost her balance and fell forward landing on her right side.  She had immediate severe pain in the right shoulder.  Also with pain in the anterior aspect of the right knee, the lateral aspect of the right hip and the right great toe.  She was unable to get herself up initially but when her  brought her a chair in order to be able to brace herself she was able to get herself up and has been ambulatory since the event.  She denies being on blood thinners but states she did hit her head.  Is having some neck pain as well.  Denies numbness or tingling.    Review Of Systems   Review of Systems   Constitutional:  Negative for chills and fever.   Respiratory:  Negative for shortness of breath.    Cardiovascular:  Negative for chest pain.   Gastrointestinal:  Negative for abdominal pain.   Musculoskeletal:  Positive for neck pain. Negative for back pain and gait problem.   Skin:  Negative for wound.       Physical Exam   Triage VS:    ED Triage Vitals [24 0415]   Enc Vitals Group      /78      Pulse 90      Respirations 20      Temp 97.9 °F (36.6 °C)      Temp Source Oral      SpO2 96 %      Weight       Height       Head Circumference       Peak Flow       Pain Score       Pain Loc       Pain Edu?       Excl. in GC?      Physical Exam  Vitals and nursing note reviewed.   Constitutional:       General: She is not in acute distress.     Appearance: She is not toxic-appearing.   HENT:      Head: Normocephalic and atraumatic.   Cardiovascular:      Rate and Rhythm: Normal rate  (4) rarely moist

## (undated) DEVICE — LINER SUCT CANSTR 1500CC SEMI RIG W/ POR HYDROPHOBIC SHUT

## (undated) DEVICE — JELLY LUBRICATING 3 GM BACTERIOSTATIC

## (undated) DEVICE — TUBING, SUCTION, 3/16" X 6', STRAIGHT: Brand: MEDLINE

## (undated) DEVICE — TUBING, SUCTION, 9/32" X 10', STRAIGHT: Brand: MEDLINE

## (undated) DEVICE — YANKAUER,FLEXIBLE HANDLE,REGLR CAPACITY: Brand: MEDLINE INDUSTRIES, INC.

## (undated) DEVICE — BRUSH CLN DIA5MM NYL SGL END CBL ASST FLEX DSTL TIP POLYPR

## (undated) DEVICE — Z DISCONTINUED (USE MFG CAT MVABO)  TUBING GAS SAMPLING STD 6.5 FT FEMALE CONN SMRT CAPNOLINE